# Patient Record
Sex: MALE | Race: WHITE | NOT HISPANIC OR LATINO | Employment: FULL TIME | URBAN - METROPOLITAN AREA
[De-identification: names, ages, dates, MRNs, and addresses within clinical notes are randomized per-mention and may not be internally consistent; named-entity substitution may affect disease eponyms.]

---

## 2017-05-01 RX ORDER — CARVEDILOL 12.5 MG/1
TABLET ORAL
Qty: 60 TABLET | Refills: 2 | OUTPATIENT
Start: 2017-05-01

## 2017-05-01 RX ORDER — HYDROCHLOROTHIAZIDE 25 MG/1
TABLET ORAL
Qty: 30 TABLET | Refills: 2 | OUTPATIENT
Start: 2017-05-01

## 2017-05-01 RX ORDER — AMLODIPINE BESYLATE 10 MG/1
TABLET ORAL
Qty: 30 TABLET | Refills: 2 | OUTPATIENT
Start: 2017-05-01

## 2017-05-17 RX ORDER — HYDROCHLOROTHIAZIDE 25 MG/1
TABLET ORAL
Qty: 30 TABLET | Refills: 2 | OUTPATIENT
Start: 2017-05-17

## 2017-05-17 RX ORDER — AMLODIPINE BESYLATE 10 MG/1
TABLET ORAL
Qty: 30 TABLET | Refills: 2 | OUTPATIENT
Start: 2017-05-17

## 2017-05-17 RX ORDER — CARVEDILOL 12.5 MG/1
TABLET ORAL
Qty: 60 TABLET | Refills: 2 | OUTPATIENT
Start: 2017-05-17

## 2017-06-12 ENCOUNTER — OFFICE VISIT (OUTPATIENT)
Dept: FAMILY MEDICINE CLINIC | Facility: CLINIC | Age: 47
End: 2017-06-12

## 2017-06-12 VITALS
WEIGHT: 315 LBS | RESPIRATION RATE: 16 BRPM | BODY MASS INDEX: 44.1 KG/M2 | DIASTOLIC BLOOD PRESSURE: 124 MMHG | HEIGHT: 71 IN | HEART RATE: 85 BPM | SYSTOLIC BLOOD PRESSURE: 228 MMHG | TEMPERATURE: 99.6 F

## 2017-06-12 DIAGNOSIS — Z12.5 SCREENING FOR PROSTATE CANCER: ICD-10-CM

## 2017-06-12 DIAGNOSIS — E66.01 MORBID OBESITY WITH BODY MASS INDEX OF 60.0-69.9 IN ADULT (HCC): ICD-10-CM

## 2017-06-12 DIAGNOSIS — I10 ESSENTIAL HYPERTENSION: ICD-10-CM

## 2017-06-12 DIAGNOSIS — E78.2 MIXED HYPERLIPIDEMIA: Primary | ICD-10-CM

## 2017-06-12 PROBLEM — G47.30 SLEEP APNEA: Status: ACTIVE | Noted: 2017-06-12

## 2017-06-12 PROBLEM — E78.5 HYPERLIPIDEMIA: Status: ACTIVE | Noted: 2017-06-12

## 2017-06-12 PROBLEM — K21.9 GERD (GASTROESOPHAGEAL REFLUX DISEASE): Status: ACTIVE | Noted: 2017-06-12

## 2017-06-12 PROCEDURE — 99214 OFFICE O/P EST MOD 30 MIN: CPT | Performed by: FAMILY MEDICINE

## 2017-06-12 RX ORDER — AMLODIPINE BESYLATE 5 MG/1
5 TABLET ORAL DAILY
Qty: 30 TABLET | Refills: 5 | Status: SHIPPED | OUTPATIENT
Start: 2017-06-12 | End: 2017-12-12 | Stop reason: SDUPTHER

## 2017-06-12 RX ORDER — CARVEDILOL 12.5 MG/1
12.5 TABLET ORAL 2 TIMES DAILY WITH MEALS
Qty: 30 TABLET | Refills: 5 | Status: SHIPPED | OUTPATIENT
Start: 2017-06-12 | End: 2017-06-13 | Stop reason: SDUPTHER

## 2017-06-12 RX ORDER — HYDROCHLOROTHIAZIDE 25 MG/1
25 TABLET ORAL DAILY
Qty: 30 TABLET | Refills: 5 | Status: SHIPPED | OUTPATIENT
Start: 2017-06-12 | End: 2017-12-12 | Stop reason: SDUPTHER

## 2017-06-12 NOTE — PROGRESS NOTES
"Subjective   Alex Schafer is a 46 y.o. male.     CC: Management of HTN, Lipids, Morbid Obesity    History of Present Illness     Pt returns today > 1 year out from last visit, completely off all his medications (stopped on pt's own choice) for review of his medical healthy. He reports he stopped the medications as he had been having a lot of issues with diverticulitis (better now) as taking the medications with the diverticulitis made him sick.  Using his CPAP nightly and well.  He is talking to Dr. Luis A Tubbs regarding weight reduction surgery and is very interested.    The following portions of the patient's history were reviewed and updated as appropriate: allergies, current medications, past family history, past medical history, past social history, past surgical history and problem list.    Review of Systems   Constitutional: Negative for activity change, chills, fatigue and fever.   Respiratory: Negative for cough and shortness of breath.    Cardiovascular: Negative for chest pain and palpitations.   Gastrointestinal: Negative for abdominal pain.   Endocrine: Negative for cold intolerance.   Psychiatric/Behavioral: Negative for behavioral problems, dysphoric mood and suicidal ideas. The patient is not nervous/anxious.      BP (!) 228/124  Pulse 85  Temp 99.6 °F (37.6 °C) (Oral)   Resp 16  Ht 71\" (180.3 cm)  Wt (!) 465 lb (211 kg)  BMI 64.85 kg/m2    Objective   Physical Exam   Constitutional: He appears well-developed and well-nourished.   Neck: Neck supple. No thyromegaly present.   Cardiovascular: Normal rate and regular rhythm.    No murmur heard.  Pulmonary/Chest: Effort normal and breath sounds normal.   Abdominal: Bowel sounds are normal.   Psychiatric: He has a normal mood and affect. His behavior is normal.   Nursing note and vitals reviewed.      Assessment/Plan   Alex was seen today for hypertension, obesity and hyperlipidemia.    Diagnoses and all orders for this visit:    Mixed " hyperlipidemia  -     Lipid panel    Essential hypertension  Comments:  severe, uncontrolled  Orders:  -     Comprehensive metabolic panel  -     Lipid panel  -     CBC and Differential  -     TSH  -     amLODIPine (NORVASC) 5 MG tablet; Take 1 tablet by mouth Daily.  -     carvedilol (COREG) 12.5 MG tablet; Take 1 tablet by mouth 2 (Two) Times a Day With Meals.  -     hydrochlorothiazide (HYDRODIURIL) 25 MG tablet; Take 1 tablet by mouth Daily.    Morbid obesity with body mass index of 60.0-69.9 in adult  -     Ambulatory Referral to Bariatric Surgery    Screening for prostate cancer  -     PSA

## 2017-06-13 ENCOUNTER — TELEPHONE (OUTPATIENT)
Dept: FAMILY MEDICINE CLINIC | Facility: CLINIC | Age: 47
End: 2017-06-13

## 2017-06-13 DIAGNOSIS — I10 ESSENTIAL HYPERTENSION: ICD-10-CM

## 2017-06-13 RX ORDER — CARVEDILOL 12.5 MG/1
12.5 TABLET ORAL 2 TIMES DAILY WITH MEALS
Qty: 60 TABLET | Refills: 5 | Status: SHIPPED | OUTPATIENT
Start: 2017-06-13 | End: 2017-12-12 | Stop reason: SDUPTHER

## 2017-06-21 LAB
ALBUMIN SERPL-MCNC: 4.7 G/DL (ref 3.5–5.2)
ALBUMIN/GLOB SERPL: 1.4 G/DL
ALP SERPL-CCNC: 88 U/L (ref 39–117)
ALT SERPL-CCNC: 68 U/L (ref 1–41)
AST SERPL-CCNC: 41 U/L (ref 1–40)
BASOPHILS # BLD AUTO: 0.03 10*3/MM3 (ref 0–0.2)
BASOPHILS NFR BLD AUTO: 0.4 % (ref 0–1.5)
BILIRUB SERPL-MCNC: 0.5 MG/DL (ref 0.1–1.2)
BUN SERPL-MCNC: 19 MG/DL (ref 6–20)
BUN/CREAT SERPL: 16.8 (ref 7–25)
CALCIUM SERPL-MCNC: 10 MG/DL (ref 8.6–10.5)
CHLORIDE SERPL-SCNC: 99 MMOL/L (ref 98–107)
CHOLEST SERPL-MCNC: 206 MG/DL (ref 0–200)
CO2 SERPL-SCNC: 22.2 MMOL/L (ref 22–29)
CREAT SERPL-MCNC: 1.13 MG/DL (ref 0.76–1.27)
EOSINOPHIL # BLD AUTO: 0.19 10*3/MM3 (ref 0–0.7)
EOSINOPHIL NFR BLD AUTO: 2.3 % (ref 0.3–6.2)
ERYTHROCYTE [DISTWIDTH] IN BLOOD BY AUTOMATED COUNT: 13.1 % (ref 11.5–14.5)
GLOBULIN SER CALC-MCNC: 3.3 GM/DL
GLUCOSE SERPL-MCNC: 96 MG/DL (ref 65–99)
HCT VFR BLD AUTO: 46.3 % (ref 40.4–52.2)
HDLC SERPL-MCNC: 40 MG/DL (ref 40–60)
HGB BLD-MCNC: 15.4 G/DL (ref 13.7–17.6)
IMM GRANULOCYTES # BLD: 0.04 10*3/MM3 (ref 0–0.03)
IMM GRANULOCYTES NFR BLD: 0.5 % (ref 0–0.5)
LDLC SERPL CALC-MCNC: 142 MG/DL (ref 0–100)
LYMPHOCYTES # BLD AUTO: 2.16 10*3/MM3 (ref 0.9–4.8)
LYMPHOCYTES NFR BLD AUTO: 26.6 % (ref 19.6–45.3)
MCH RBC QN AUTO: 32.1 PG (ref 27–32.7)
MCHC RBC AUTO-ENTMCNC: 33.3 G/DL (ref 32.6–36.4)
MCV RBC AUTO: 96.5 FL (ref 79.8–96.2)
MONOCYTES # BLD AUTO: 0.74 10*3/MM3 (ref 0.2–1.2)
MONOCYTES NFR BLD AUTO: 9.1 % (ref 5–12)
NEUTROPHILS # BLD AUTO: 4.96 10*3/MM3 (ref 1.9–8.1)
NEUTROPHILS NFR BLD AUTO: 61.1 % (ref 42.7–76)
PLATELET # BLD AUTO: 262 10*3/MM3 (ref 140–500)
POTASSIUM SERPL-SCNC: 4.2 MMOL/L (ref 3.5–5.2)
PROT SERPL-MCNC: 8 G/DL (ref 6–8.5)
PSA SERPL-MCNC: 0.53 NG/ML (ref 0–4)
RBC # BLD AUTO: 4.8 10*6/MM3 (ref 4.6–6)
SODIUM SERPL-SCNC: 142 MMOL/L (ref 136–145)
TRIGL SERPL-MCNC: 121 MG/DL (ref 0–150)
TSH SERPL DL<=0.005 MIU/L-ACNC: 3.91 MIU/ML (ref 0.27–4.2)
VLDLC SERPL CALC-MCNC: 24.2 MG/DL (ref 5–40)
WBC # BLD AUTO: 8.12 10*3/MM3 (ref 4.5–10.7)

## 2017-12-12 ENCOUNTER — OFFICE VISIT (OUTPATIENT)
Dept: FAMILY MEDICINE CLINIC | Facility: CLINIC | Age: 47
End: 2017-12-12

## 2017-12-12 VITALS
BODY MASS INDEX: 44.1 KG/M2 | WEIGHT: 315 LBS | TEMPERATURE: 99.8 F | DIASTOLIC BLOOD PRESSURE: 96 MMHG | SYSTOLIC BLOOD PRESSURE: 210 MMHG | HEART RATE: 89 BPM | RESPIRATION RATE: 18 BRPM | HEIGHT: 71 IN

## 2017-12-12 DIAGNOSIS — I10 ESSENTIAL HYPERTENSION: ICD-10-CM

## 2017-12-12 DIAGNOSIS — G47.33 OBSTRUCTIVE SLEEP APNEA SYNDROME: Primary | ICD-10-CM

## 2017-12-12 PROCEDURE — 99213 OFFICE O/P EST LOW 20 MIN: CPT | Performed by: FAMILY MEDICINE

## 2017-12-12 RX ORDER — HYDRALAZINE HYDROCHLORIDE 10 MG/1
10 TABLET, FILM COATED ORAL 2 TIMES DAILY
Qty: 60 TABLET | Refills: 5 | Status: SHIPPED | OUTPATIENT
Start: 2017-12-12 | End: 2018-07-31 | Stop reason: ALTCHOICE

## 2017-12-12 RX ORDER — CARVEDILOL 25 MG/1
25 TABLET ORAL 2 TIMES DAILY WITH MEALS
Qty: 60 TABLET | Refills: 5 | Status: SHIPPED | OUTPATIENT
Start: 2017-12-12 | End: 2018-06-12 | Stop reason: SDUPTHER

## 2017-12-12 RX ORDER — CARVEDILOL 12.5 MG/1
12.5 TABLET ORAL 2 TIMES DAILY WITH MEALS
Qty: 60 TABLET | Refills: 5 | Status: CANCELLED | OUTPATIENT
Start: 2017-12-12

## 2017-12-12 RX ORDER — HYDROCHLOROTHIAZIDE 25 MG/1
25 TABLET ORAL DAILY
Qty: 30 TABLET | Refills: 5 | Status: SHIPPED | OUTPATIENT
Start: 2017-12-12 | End: 2018-06-11 | Stop reason: SDUPTHER

## 2017-12-12 RX ORDER — AMLODIPINE BESYLATE 5 MG/1
5 TABLET ORAL DAILY
Qty: 30 TABLET | Refills: 5 | Status: SHIPPED | OUTPATIENT
Start: 2017-12-12 | End: 2018-07-31 | Stop reason: ALTCHOICE

## 2017-12-12 NOTE — PROGRESS NOTES
"Subjective   Alex Schafer is a 47 y.o. male.     History of Present Illness     Chief Complaint:   Chief Complaint   Patient presents with   • Hypertension     MED REFILL - COLONSCOPY DUE EVERY YEAR PER PT  = DR BALJINDER GUZMAN BP TODAY    • Hyperlipidemia     EYE EXAM DUE        Alex Schafer 47 y.o. male who presents today for Medical Management of the below listed issues and medication refills.  he has a problem list of   Patient Active Problem List   Diagnosis   • Hyperlipidemia   • Hypertension   • GERD (gastroesophageal reflux disease)   • Morbid obesity with body mass index of 60.0-69.9 in adult   • Sleep apnea   .  Since the last visit, he has overall felt well.  he has been compliant with   Current Outpatient Prescriptions:   •  carvedilol (COREG) 25 MG tablet, Take 1 tablet by mouth 2 (Two) Times a Day With Meals., Disp: 60 tablet, Rfl: 5  •  amLODIPine (NORVASC) 5 MG tablet, Take 1 tablet by mouth Daily., Disp: 30 tablet, Rfl: 5  •  hydrALAZINE (APRESOLINE) 10 MG tablet, Take 1 tablet by mouth 2 (Two) Times a Day., Disp: 60 tablet, Rfl: 5  •  hydrochlorothiazide (HYDRODIURIL) 25 MG tablet, Take 1 tablet by mouth Daily., Disp: 30 tablet, Rfl: 5.  he denies medication side effects.    Pt using his CPAP well every day but hasn't had a titration in approximately 10 years!    All of the chronic condition(s) listed above are stable w/o issues.    BP (!) 210/96  Pulse 89  Temp 99.8 °F (37.7 °C) (Oral)   Resp 18  Ht 180.3 cm (71\")  Wt (!) 204 kg (450 lb)  BMI 62.76 kg/m2    Results for orders placed or performed in visit on 06/12/17   Comprehensive metabolic panel   Result Value Ref Range    Glucose 96 65 - 99 mg/dL    BUN 19 6 - 20 mg/dL    Creatinine 1.13 0.76 - 1.27 mg/dL    eGFR Non African Am 70 >60 mL/min/1.73    eGFR African Am 84 >60 mL/min/1.73    BUN/Creatinine Ratio 16.8 7.0 - 25.0    Sodium 142 136 - 145 mmol/L    Potassium 4.2 3.5 - 5.2 mmol/L    Chloride 99 98 - 107 mmol/L    Total CO2 22.2 22.0 " - 29.0 mmol/L    Calcium 10.0 8.6 - 10.5 mg/dL    Total Protein 8.0 6.0 - 8.5 g/dL    Albumin 4.70 3.50 - 5.20 g/dL    Globulin 3.3 gm/dL    A/G Ratio 1.4 g/dL    Total Bilirubin 0.5 0.1 - 1.2 mg/dL    Alkaline Phosphatase 88 39 - 117 U/L    AST (SGOT) 41 (H) 1 - 40 U/L    ALT (SGPT) 68 (H) 1 - 41 U/L   Lipid panel   Result Value Ref Range    Total Cholesterol 206 (H) 0 - 200 mg/dL    Triglycerides 121 0 - 150 mg/dL    HDL Cholesterol 40 40 - 60 mg/dL    VLDL Cholesterol 24.2 5 - 40 mg/dL    LDL Cholesterol  142 (H) 0 - 100 mg/dL   TSH   Result Value Ref Range    TSH 3.910 0.270 - 4.200 mIU/mL   PSA   Result Value Ref Range    PSA 0.525 0.000 - 4.000 ng/mL   CBC and Differential   Result Value Ref Range    WBC 8.12 4.50 - 10.70 10*3/mm3    RBC 4.80 4.60 - 6.00 10*6/mm3    Hemoglobin 15.4 13.7 - 17.6 g/dL    Hematocrit 46.3 40.4 - 52.2 %    MCV 96.5 (H) 79.8 - 96.2 fL    MCH 32.1 27.0 - 32.7 pg    MCHC 33.3 32.6 - 36.4 g/dL    RDW 13.1 11.5 - 14.5 %    Platelets 262 140 - 500 10*3/mm3    Neutrophil Rel % 61.1 42.7 - 76.0 %    Lymphocyte Rel % 26.6 19.6 - 45.3 %    Monocyte Rel % 9.1 5.0 - 12.0 %    Eosinophil Rel % 2.3 0.3 - 6.2 %    Basophil Rel % 0.4 0.0 - 1.5 %    Neutrophils Absolute 4.96 1.90 - 8.10 10*3/mm3    Lymphocytes Absolute 2.16 0.90 - 4.80 10*3/mm3    Monocytes Absolute 0.74 0.20 - 1.20 10*3/mm3    Eosinophils Absolute 0.19 0.00 - 0.70 10*3/mm3    Basophils Absolute 0.03 0.00 - 0.20 10*3/mm3    Immature Granulocyte Rel % 0.5 0.0 - 0.5 %    Immature Grans Absolute 0.04 (H) 0.00 - 0.03 10*3/mm3           The following portions of the patient's history were reviewed and updated as appropriate: allergies, current medications, past family history, past medical history, past social history, past surgical history and problem list.    Review of Systems   Constitutional: Negative for activity change, chills, fatigue and fever.   Respiratory: Negative for cough and shortness of breath.    Cardiovascular: Negative  for chest pain and palpitations.   Gastrointestinal: Negative for abdominal pain.   Endocrine: Negative for cold intolerance.   Psychiatric/Behavioral: Negative for behavioral problems and dysphoric mood. The patient is not nervous/anxious.        Objective   Physical Exam   Constitutional: He appears well-developed and well-nourished.   Neck: Neck supple. No thyromegaly present.   Cardiovascular: Normal rate and regular rhythm.    No murmur heard.  Pulmonary/Chest: Effort normal and breath sounds normal.   Abdominal: Bowel sounds are normal.   Psychiatric: He has a normal mood and affect. His behavior is normal.   Nursing note and vitals reviewed.      Assessment/Plan   Alex was seen today for hypertension and hyperlipidemia.    Diagnoses and all orders for this visit:    Obstructive sleep apnea syndrome  -     Ambulatory Referral to Sleep Medicine    Essential hypertension  Comments:  severe, uncontrolled  Orders:  -     amLODIPine (NORVASC) 5 MG tablet; Take 1 tablet by mouth Daily.  -     hydrochlorothiazide (HYDRODIURIL) 25 MG tablet; Take 1 tablet by mouth Daily.  -     carvedilol (COREG) 25 MG tablet; Take 1 tablet by mouth 2 (Two) Times a Day With Meals.  -     hydrALAZINE (APRESOLINE) 10 MG tablet; Take 1 tablet by mouth 2 (Two) Times a Day.    Other orders  -     Cancel: carvedilol (COREG) 12.5 MG tablet; Take 1 tablet by mouth 2 (Two) Times a Day With Meals.

## 2017-12-29 ENCOUNTER — TELEPHONE (OUTPATIENT)
Dept: FAMILY MEDICINE CLINIC | Facility: CLINIC | Age: 47
End: 2017-12-29

## 2017-12-29 NOTE — TELEPHONE ENCOUNTER
Patient called with concerns about BP being to low since change of medication and has had a few 'collapsing' spells at work with BP running 112/70 at the time. Spoke with Antonina who suggests it could be another issue rather than the BP medication and to D/C amlodipine and follow up next week with a physician. Informed patient to go to ER with any further collapsing episodes between now and when he makes his appt

## 2018-01-23 ENCOUNTER — APPOINTMENT (OUTPATIENT)
Dept: SLEEP MEDICINE | Facility: HOSPITAL | Age: 48
End: 2018-01-23
Attending: INTERNAL MEDICINE

## 2018-06-11 RX ORDER — HYDRALAZINE HYDROCHLORIDE 10 MG/1
10 TABLET, FILM COATED ORAL 2 TIMES DAILY
Qty: 60 TABLET | Refills: 5 | Status: CANCELLED | OUTPATIENT
Start: 2018-06-11

## 2018-06-11 RX ORDER — AMLODIPINE BESYLATE 5 MG/1
5 TABLET ORAL DAILY
Qty: 30 TABLET | Refills: 5 | Status: CANCELLED | OUTPATIENT
Start: 2018-06-11

## 2018-06-11 RX ORDER — CARVEDILOL 25 MG/1
25 TABLET ORAL 2 TIMES DAILY WITH MEALS
Qty: 60 TABLET | Refills: 5 | Status: CANCELLED | OUTPATIENT
Start: 2018-06-11

## 2018-06-11 NOTE — PROGRESS NOTES
"Subjective   Alex Schafer is a 47 y.o. male.     History of Present Illness     Chief Complaint:   Chief Complaint   Patient presents with   • Hypertension     med refill  - no labs   • Hyperlipidemia     pt not taiking bp meds as prescribed only hctz per pt        Alex Schafer 47 y.o. male who presents today for Medical Management of the below listed issues and medication refills.  he has a problem list of   Patient Active Problem List   Diagnosis   • Hyperlipidemia   • Hypertension   • GERD (gastroesophageal reflux disease)   • Morbid obesity with body mass index of 60.0-69.9 in adult   • Sleep apnea   .  Since the last visit, he has overall felt well.  he has NOT been compliant with   Current Outpatient Prescriptions:   •  amLODIPine (NORVASC) 5 MG tablet, Take 1 tablet by mouth Daily., Disp: 30 tablet, Rfl: 5  •  carvedilol (COREG) 25 MG tablet, Take 1 tablet by mouth 2 (Two) Times a Day With Meals., Disp: 60 tablet, Rfl: 5  •  hydrALAZINE (APRESOLINE) 10 MG tablet, Take 1 tablet by mouth 2 (Two) Times a Day., Disp: 60 tablet, Rfl: 5  •  hydrochlorothiazide (HYDRODIURIL) 25 MG tablet, Take 1 tablet by mouth Daily., Disp: 30 tablet, Rfl: 5.  he denies medication side effects.    He has NOT been taking anything but his HCTZ as he \"bottoms out\" when he takes anything else. BPs run as high at home as they do here. BPs taken by a nurse at work shows some very low BPs at times, even leading to some fall, although doesn't happen all the time. BPs are extremely variable. Uses his CPAP well and has had his pressures checked recently.    All of the chronic condition(s) listed above are stable w/o issues.    BP (!) 213/104   Pulse 96   Temp 99.7 °F (37.6 °C) (Oral)   Resp 18   Ht 180.3 cm (71\")   Wt (!) 204 kg (450 lb)   BMI 62.76 kg/m²     Results for orders placed or performed in visit on 06/12/17   Comprehensive metabolic panel   Result Value Ref Range    Glucose 96 65 - 99 mg/dL    BUN 19 6 - 20 mg/dL    Creatinine " 1.13 0.76 - 1.27 mg/dL    eGFR Non African Am 70 >60 mL/min/1.73    eGFR African Am 84 >60 mL/min/1.73    BUN/Creatinine Ratio 16.8 7.0 - 25.0    Sodium 142 136 - 145 mmol/L    Potassium 4.2 3.5 - 5.2 mmol/L    Chloride 99 98 - 107 mmol/L    Total CO2 22.2 22.0 - 29.0 mmol/L    Calcium 10.0 8.6 - 10.5 mg/dL    Total Protein 8.0 6.0 - 8.5 g/dL    Albumin 4.70 3.50 - 5.20 g/dL    Globulin 3.3 gm/dL    A/G Ratio 1.4 g/dL    Total Bilirubin 0.5 0.1 - 1.2 mg/dL    Alkaline Phosphatase 88 39 - 117 U/L    AST (SGOT) 41 (H) 1 - 40 U/L    ALT (SGPT) 68 (H) 1 - 41 U/L   Lipid panel   Result Value Ref Range    Total Cholesterol 206 (H) 0 - 200 mg/dL    Triglycerides 121 0 - 150 mg/dL    HDL Cholesterol 40 40 - 60 mg/dL    VLDL Cholesterol 24.2 5 - 40 mg/dL    LDL Cholesterol  142 (H) 0 - 100 mg/dL   TSH   Result Value Ref Range    TSH 3.910 0.270 - 4.200 mIU/mL   PSA   Result Value Ref Range    PSA 0.525 0.000 - 4.000 ng/mL   CBC and Differential   Result Value Ref Range    WBC 8.12 4.50 - 10.70 10*3/mm3    RBC 4.80 4.60 - 6.00 10*6/mm3    Hemoglobin 15.4 13.7 - 17.6 g/dL    Hematocrit 46.3 40.4 - 52.2 %    MCV 96.5 (H) 79.8 - 96.2 fL    MCH 32.1 27.0 - 32.7 pg    MCHC 33.3 32.6 - 36.4 g/dL    RDW 13.1 11.5 - 14.5 %    Platelets 262 140 - 500 10*3/mm3    Neutrophil Rel % 61.1 42.7 - 76.0 %    Lymphocyte Rel % 26.6 19.6 - 45.3 %    Monocyte Rel % 9.1 5.0 - 12.0 %    Eosinophil Rel % 2.3 0.3 - 6.2 %    Basophil Rel % 0.4 0.0 - 1.5 %    Neutrophils Absolute 4.96 1.90 - 8.10 10*3/mm3    Lymphocytes Absolute 2.16 0.90 - 4.80 10*3/mm3    Monocytes Absolute 0.74 0.20 - 1.20 10*3/mm3    Eosinophils Absolute 0.19 0.00 - 0.70 10*3/mm3    Basophils Absolute 0.03 0.00 - 0.20 10*3/mm3    Immature Granulocyte Rel % 0.5 0.0 - 0.5 %    Immature Grans Absolute 0.04 (H) 0.00 - 0.03 10*3/mm3           The following portions of the patient's history were reviewed and updated as appropriate: allergies, current medications, past family history,  past medical history, past social history, past surgical history and problem list.    Review of Systems   Constitutional: Negative for activity change, chills, fatigue and fever.   Respiratory: Negative for cough and shortness of breath.    Cardiovascular: Negative for chest pain and palpitations.   Gastrointestinal: Negative for abdominal pain.   Endocrine: Negative for cold intolerance.   Psychiatric/Behavioral: Negative for behavioral problems and dysphoric mood. The patient is not nervous/anxious.        Objective   Physical Exam   Constitutional: He appears well-developed and well-nourished.   Neck: Neck supple. No thyromegaly present.   Cardiovascular: Normal rate and regular rhythm.    No murmur heard.  Pulmonary/Chest: Effort normal and breath sounds normal.   Abdominal: Bowel sounds are normal. There is no tenderness.   Psychiatric: He has a normal mood and affect. His behavior is normal.   Nursing note and vitals reviewed.      Assessment/Plan   Alex was seen today for hypertension and hyperlipidemia.    Diagnoses and all orders for this visit:    Essential hypertension  Comments:  severe, uncontrolled  Orders:  -     Comprehensive metabolic panel  -     Lipid panel  -     CBC and Differential  -     TSH  -     hydrochlorothiazide (HYDRODIURIL) 25 MG tablet; Take 1 tablet by mouth Daily.  -     Ambulatory Referral to Cardiology  -     carvedilol (COREG) 25 MG tablet; Take 1 tablet by mouth 2 (Two) Times a Day With Meals.    Screening for prostate cancer  -     PSA    Other orders  -     Cancel: carvedilol (COREG) 25 MG tablet; Take 1 tablet by mouth 2 (Two) Times a Day With Meals.  -     Cancel: amLODIPine (NORVASC) 5 MG tablet; Take 1 tablet by mouth Daily.  -     Cancel: hydrALAZINE (APRESOLINE) 10 MG tablet; Take 1 tablet by mouth 2 (Two) Times a Day.    Long discussion with pt regarding his severe risk to life if not following medical advice, taking medications and f/u in a timly manner. Pt voices  understanding.

## 2018-06-12 ENCOUNTER — OFFICE VISIT (OUTPATIENT)
Dept: FAMILY MEDICINE CLINIC | Facility: CLINIC | Age: 48
End: 2018-06-12

## 2018-06-12 VITALS
HEART RATE: 96 BPM | HEIGHT: 71 IN | RESPIRATION RATE: 18 BRPM | SYSTOLIC BLOOD PRESSURE: 213 MMHG | WEIGHT: 315 LBS | TEMPERATURE: 99.7 F | DIASTOLIC BLOOD PRESSURE: 104 MMHG | BODY MASS INDEX: 44.1 KG/M2

## 2018-06-12 DIAGNOSIS — I10 ESSENTIAL HYPERTENSION: Primary | ICD-10-CM

## 2018-06-12 DIAGNOSIS — Z12.5 SCREENING FOR PROSTATE CANCER: ICD-10-CM

## 2018-06-12 LAB
ALBUMIN SERPL-MCNC: 4.7 G/DL (ref 3.5–5.2)
ALBUMIN/GLOB SERPL: 1.5 G/DL
ALP SERPL-CCNC: 89 U/L (ref 39–117)
ALT SERPL-CCNC: 72 U/L (ref 1–41)
AST SERPL-CCNC: 39 U/L (ref 1–40)
BASOPHILS # BLD AUTO: 0.02 10*3/MM3 (ref 0–0.2)
BASOPHILS NFR BLD AUTO: 0.3 % (ref 0–1.5)
BILIRUB SERPL-MCNC: 0.5 MG/DL (ref 0.1–1.2)
BUN SERPL-MCNC: 17 MG/DL (ref 6–20)
BUN/CREAT SERPL: 14.7 (ref 7–25)
CALCIUM SERPL-MCNC: 9.8 MG/DL (ref 8.6–10.5)
CHLORIDE SERPL-SCNC: 103 MMOL/L (ref 98–107)
CHOLEST SERPL-MCNC: 212 MG/DL (ref 0–200)
CO2 SERPL-SCNC: 27.3 MMOL/L (ref 22–29)
CREAT SERPL-MCNC: 1.16 MG/DL (ref 0.76–1.27)
EOSINOPHIL # BLD AUTO: 0.13 10*3/MM3 (ref 0–0.7)
EOSINOPHIL NFR BLD AUTO: 1.7 % (ref 0.3–6.2)
ERYTHROCYTE [DISTWIDTH] IN BLOOD BY AUTOMATED COUNT: 13 % (ref 11.5–14.5)
GFR SERPLBLD CREATININE-BSD FMLA CKD-EPI: 67 ML/MIN/1.73
GFR SERPLBLD CREATININE-BSD FMLA CKD-EPI: 81 ML/MIN/1.73
GLOBULIN SER CALC-MCNC: 3.2 GM/DL
GLUCOSE SERPL-MCNC: 124 MG/DL (ref 65–99)
HCT VFR BLD AUTO: 45.2 % (ref 40.4–52.2)
HDLC SERPL-MCNC: 33 MG/DL (ref 40–60)
HGB BLD-MCNC: 14.8 G/DL (ref 13.7–17.6)
IMM GRANULOCYTES # BLD: 0.05 10*3/MM3 (ref 0–0.03)
IMM GRANULOCYTES NFR BLD: 0.6 % (ref 0–0.5)
LDLC SERPL CALC-MCNC: 154 MG/DL (ref 0–100)
LYMPHOCYTES # BLD AUTO: 2.13 10*3/MM3 (ref 0.9–4.8)
LYMPHOCYTES NFR BLD AUTO: 27.3 % (ref 19.6–45.3)
MCH RBC QN AUTO: 32.4 PG (ref 27–32.7)
MCHC RBC AUTO-ENTMCNC: 32.7 G/DL (ref 32.6–36.4)
MCV RBC AUTO: 98.9 FL (ref 79.8–96.2)
MONOCYTES # BLD AUTO: 0.71 10*3/MM3 (ref 0.2–1.2)
MONOCYTES NFR BLD AUTO: 9.1 % (ref 5–12)
NEUTROPHILS # BLD AUTO: 4.82 10*3/MM3 (ref 1.9–8.1)
NEUTROPHILS NFR BLD AUTO: 61.6 % (ref 42.7–76)
PLATELET # BLD AUTO: 236 10*3/MM3 (ref 140–500)
POTASSIUM SERPL-SCNC: 4 MMOL/L (ref 3.5–5.2)
PROT SERPL-MCNC: 7.9 G/DL (ref 6–8.5)
PSA SERPL-MCNC: 0.56 NG/ML (ref 0–4)
RBC # BLD AUTO: 4.57 10*6/MM3 (ref 4.6–6)
SODIUM SERPL-SCNC: 143 MMOL/L (ref 136–145)
TRIGL SERPL-MCNC: 124 MG/DL (ref 0–150)
TSH SERPL DL<=0.005 MIU/L-ACNC: 1.98 MIU/ML (ref 0.27–4.2)
VLDLC SERPL CALC-MCNC: 24.8 MG/DL (ref 5–40)
WBC # BLD AUTO: 7.81 10*3/MM3 (ref 4.5–10.7)

## 2018-06-12 PROCEDURE — 99213 OFFICE O/P EST LOW 20 MIN: CPT | Performed by: FAMILY MEDICINE

## 2018-06-12 RX ORDER — HYDROCHLOROTHIAZIDE 25 MG/1
25 TABLET ORAL DAILY
Qty: 30 TABLET | Refills: 5 | Status: SHIPPED | OUTPATIENT
Start: 2018-06-12 | End: 2018-12-11 | Stop reason: SDUPTHER

## 2018-06-12 RX ORDER — CARVEDILOL 25 MG/1
25 TABLET ORAL 2 TIMES DAILY WITH MEALS
Qty: 60 TABLET | Refills: 5 | Status: SHIPPED | OUTPATIENT
Start: 2018-06-12 | End: 2018-12-11 | Stop reason: SDUPTHER

## 2018-07-30 ENCOUNTER — DOCUMENTATION (OUTPATIENT)
Dept: CARDIOLOGY | Facility: CLINIC | Age: 48
End: 2018-07-30

## 2018-07-31 ENCOUNTER — OFFICE VISIT (OUTPATIENT)
Dept: CARDIOLOGY | Facility: CLINIC | Age: 48
End: 2018-07-31

## 2018-07-31 VITALS
HEIGHT: 71 IN | SYSTOLIC BLOOD PRESSURE: 154 MMHG | BODY MASS INDEX: 44.1 KG/M2 | HEART RATE: 82 BPM | WEIGHT: 315 LBS | DIASTOLIC BLOOD PRESSURE: 100 MMHG

## 2018-07-31 DIAGNOSIS — I10 ESSENTIAL HYPERTENSION: Primary | ICD-10-CM

## 2018-07-31 PROCEDURE — 99204 OFFICE O/P NEW MOD 45 MIN: CPT | Performed by: INTERNAL MEDICINE

## 2018-07-31 PROCEDURE — 93000 ELECTROCARDIOGRAM COMPLETE: CPT | Performed by: INTERNAL MEDICINE

## 2018-08-02 NOTE — PROGRESS NOTES
Subjective:     Encounter Date:07/31/2018      Patient ID: Alex Schafer is a 47 y.o. male.    Chief Complaint:  Hypertension   This is a chronic problem. The problem is resistant.       47-year-old gentleman who presents today for evaluation.Patient has had blood pressure issues.  It has been fluctuating quite a bit.  Is running as high as 213/104 and other times dropped down on her medicines down to 68/46.  His weight has not recently changed in the past 4-5 years.  He denies chest pain shortness of breath and dizziness.  He does have increasing fatigue and with his volatile blood pressure he was seen today for further evaluation    Review of Systems   All other systems reviewed and are negative.        ECG 12 Lead  Date/Time: 7/31/2018 8:55 AM  Performed by: GIOVANI DURHAM  Authorized by: GIOVANI DURHAM   Comparison: compared with previous ECG from 4/24/2015  Similar to previous ECG  Rhythm: sinus rhythm  Clinical impression: normal ECG               Objective:     Physical Exam   Constitutional: He is oriented to person, place, and time. He appears well-developed.   HENT:   Head: Normocephalic.   Eyes: Conjunctivae are normal.   Neck: Normal range of motion.   Cardiovascular: Normal rate, regular rhythm and normal heart sounds.    Pulmonary/Chest: Breath sounds normal.   Abdominal: Soft. Bowel sounds are normal.   Musculoskeletal: Normal range of motion. He exhibits no edema.   Neurological: He is alert and oriented to person, place, and time.   Skin: Skin is warm and dry.   Psychiatric: He has a normal mood and affect. His behavior is normal.   Vitals reviewed.      Lab Review:       Assessment:          Diagnosis Plan   1. Essential hypertension            Plan:         1.  Very volatile blood pressure.  On multiple medications been fluctuating quite a bit.  I very extensive discussion with the patient including a lot of time explaining the fact that salt is more than likely what is causing his  extreme swings in blood pressure.  He's got to start watching his salt intake weren't done a diary and educating himself.  Obviously if he can lose some weight that would help some I also told some basic exercise would also be very beneficial.  He is going try to clean things up before we start very his medications see how he does and follow back up with him in 4-6 weeks.

## 2018-08-28 ENCOUNTER — OFFICE VISIT (OUTPATIENT)
Dept: CARDIOLOGY | Facility: CLINIC | Age: 48
End: 2018-08-28

## 2018-08-28 VITALS
DIASTOLIC BLOOD PRESSURE: 100 MMHG | WEIGHT: 315 LBS | BODY MASS INDEX: 44.1 KG/M2 | HEIGHT: 71 IN | HEART RATE: 76 BPM | SYSTOLIC BLOOD PRESSURE: 160 MMHG

## 2018-08-28 DIAGNOSIS — E78.2 MIXED HYPERLIPIDEMIA: ICD-10-CM

## 2018-08-28 DIAGNOSIS — I10 ESSENTIAL HYPERTENSION: Primary | ICD-10-CM

## 2018-08-28 DIAGNOSIS — G47.33 OBSTRUCTIVE SLEEP APNEA SYNDROME: ICD-10-CM

## 2018-08-28 DIAGNOSIS — E66.01 MORBID OBESITY WITH BODY MASS INDEX OF 60.0-69.9 IN ADULT (HCC): ICD-10-CM

## 2018-08-28 PROCEDURE — 93000 ELECTROCARDIOGRAM COMPLETE: CPT | Performed by: NURSE PRACTITIONER

## 2018-08-28 PROCEDURE — 99214 OFFICE O/P EST MOD 30 MIN: CPT | Performed by: NURSE PRACTITIONER

## 2018-08-28 NOTE — PROGRESS NOTES
"Date of Office Visit: 2018  Encounter Provider: THAI Galindo  Place of Service: [facility] CARDIOLOGY  Patient Name: Alex Schafer  :1970    Chief Complaint   Patient presents with   • Hypertension   • Chest Pain   • Palpitations   • Shortness of Breath   • Edema   • Fatigue   :     HPI: Alex Schafer is a 47 y.o. male is a patient of Dr. Ritter. I am seeing him today for the first time and have reviewed his record.     His past medical history is significant of hypertension, hyperlipidemia, morbid obesity,GERBER, GERD, asthma, palpitations, and peptic ulcer.    Patient was evaluated on for elevated blood pressure 213/104.  His blood pressure was even as low as 68/46.  His weight has not changed in over 45 years.  He had increasing fatigue.  It was discussed salt likely causing his extreme blood pressure was encouraged to start monitoring that and keep follow-up diarrhea.  He was encouraged to lose weight.  He was to follow-up in 4-6 weeks.    Patient presents today for follow up.  His blood pressure is 160/100.  He has a blood pressure log for which he has completed when he is at work.  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  He is values of 110 systolic also 1:30 systolic as well as 160-200 systolic.  In the past he was on hydralazine 10 mg twice a day and amlodipine 5 mg daily in addition to his carvedilol 25 mg twice daily and hydrochlorothiazide 25 mg daily.  He has been maintained on carvedilol and hydrochlorothiazide with hopes that monitoring sodium intake and increased physical activity would help to improve his blood pressure and decrease fluctuation.  He is walking a couple days a week and is \"doing better with his sodium\".  He has now started to walk a couple days out of the week for 10-15 minutes.  Prior to his last visit he had never paid attention to his sodium intake.  He works third  shift and occasionally goes " "36-38 hours without sleep.  [place] the [place] and have one episode of chest heaviness which lasted for an hour on and off while he was sitting.  He does report eating funnel cake which might have caused that.  He has a history of GERD in his previously taking medication for that.  He regularly wears his CPAP.    Allergies   Allergen Reactions   • Shellfish Allergy Other (See Comments)     Throat closes   • Lisinopril Other (See Comments)     Pr dose not recall what type of reaction he had       Past Medical History:   Diagnosis Date   • Abdominal pain    • Asthma, exercise induced    • Back pain    • Bipolar disorder (CMS/HCC)    • Chest pain    • Chronic peptic ulcer    • Depression    • Diarrhea    • Difficulty sleeping    • Diverticulitis    • Dizziness    • Erectile dysfunction    • Fatigue    • Fever    • Gas    • GERD (gastroesophageal reflux disease)    • Headache    • Hepatitis A virus infection    • Hyperlipidemia    • Hypertension    • Irregular heart beat    • Itching    • Leg swelling    • Nausea    • Nervousness    • Palpitations    • Pneumonia    • Rectal bleeding    • Renal failure     \"small\"   • Seasonal allergies    • Sleep apnea    • Stomach cramps    • Vomiting        Past Surgical History:   Procedure Laterality Date   • COLONOSCOPY     • TONSILLECTOMY     • TONSILLECTOMY           Family and social history reviewed.     Review of Systems   Constitution: Positive for malaise/fatigue.   Cardiovascular: Positive for dyspnea on exertion, leg swelling and palpitations.   Musculoskeletal: Positive for joint pain.     All other systems were reviewed and are negative          Objective:     Vitals:    08/28/18 0919   BP: 160/100   BP Location: Left arm   Patient Position: Sitting   Pulse: 76   Weight: (!) 204 kg (450 lb)   Height: 180.3 cm (71\")     Body mass index is 62.76 kg/m².    PHYSICAL EXAM:  Physical Exam   Constitutional: He is oriented to person, place, and time. He appears " well-developed and well-nourished. No distress.   Morbid obese   HENT:   Head: Normocephalic.   Eyes: Conjunctivae are normal.   Glasses on   Neck: Normal range of motion. No JVD present.   Cardiovascular: Normal rate, regular rhythm, normal heart sounds and intact distal pulses.    No murmur heard.  Pulses:       Carotid pulses are 2+ on the right side, and 2+ on the left side.       Radial pulses are 2+ on the right side, and 2+ on the left side.        Posterior tibial pulses are 2+ on the right side, and 2+ on the left side.   Pulmonary/Chest: Effort normal and breath sounds normal. No respiratory distress. He has no wheezes. He has no rhonchi. He has no rales. He exhibits no tenderness.   Abdominal: Soft. Bowel sounds are normal. He exhibits no distension.   Musculoskeletal: Normal range of motion. He exhibits edema (trace bilateral ankle).   Neurological: He is alert and oriented to person, place, and time.   Skin: Skin is warm, dry and intact. No rash noted. He is not diaphoretic. No cyanosis.   Psychiatric: He has a normal mood and affect. His behavior is normal. Judgment and thought content normal.         ECG 12 Lead  Date/Time: 8/28/2018 9:32 AM  Performed by: GINO SUMNER  Authorized by: GINO SUMNER   Comparison: compared with previous ECG from 7/31/2018  Similar to previous ECG  Rhythm: sinus rhythm  Rate: normal  ST Segments: ST segments normal  T Waves: T waves normal  QRS axis: normal  Clinical impression: normal ECG            Current Outpatient Prescriptions   Medication Sig Dispense Refill   • carvedilol (COREG) 25 MG tablet Take 1 tablet by mouth 2 (Two) Times a Day With Meals. 60 tablet 5   • hydrochlorothiazide (HYDRODIURIL) 25 MG tablet Take 1 tablet by mouth Daily. 30 tablet 5     No current facility-administered medications for this visit.      Assessment:       Diagnosis Plan   1. Essential hypertension  ECG 12 Lead   2. Morbid obesity with body mass index of 60.0-69.9 in adult (CMS/HCC)      3. Obstructive sleep apnea syndrome     4. Mixed hyperlipidemia          Orders Placed This Encounter   Procedures   • ECG 12 Lead     This order was created via procedure documentation         Plan:         1. Hypertension- continues to be labile patient has now started to monitor his sodium intake.  He is starting to walk a couple days a week but plans to increase that activity to 30 minutes at least 5 days out of the week.  I encouraged that he take walking breaks while at work to help increase his mobility.    2. Hyperlipidemia  total cholesterol 212 on lipid panel 06/12/2018.  He would benefit from low-dose statin such as atorvastatin 20 mg.    3. GERBER compliant with CPAP     4. Morbid obesity- we discussed diet modification, increased physical activity and structured exercise as well was weight loss.    5.  10 year ASCVD risk of 8.4% compared with those of same age without normal risk factors at 1.7%.  He would benefit from taking aspirin 81 mg daily and I recommended that he started doing for this reason.    Follow up in 2 months with me. We will re-assess blood pressure trends, weight, and determine if his medications should be adjusted at that time.     Patient was instructed to call the office if new symptoms develop or report to nearest ER if heart attack or stroke is suspected.        It has been a pleasure to participate in this patient's care.      Thank you,  THAI Galindo      **Raji Disclaimer:**  Much of this encounter note is an electronic transcription/translation of spoken language to printed text. The electronic translation of spoken language may permit erroneous, or at times, nonsensical words or phrases to be inadvertently transcribed. Although I have reviewed the note for such errors, some may still exist.

## 2018-09-04 ENCOUNTER — OFFICE VISIT (OUTPATIENT)
Dept: RETAIL CLINIC | Facility: CLINIC | Age: 48
End: 2018-09-04

## 2018-09-04 VITALS
TEMPERATURE: 99.1 F | SYSTOLIC BLOOD PRESSURE: 156 MMHG | HEART RATE: 76 BPM | DIASTOLIC BLOOD PRESSURE: 98 MMHG | RESPIRATION RATE: 18 BRPM | OXYGEN SATURATION: 98 %

## 2018-09-04 DIAGNOSIS — J40 BRONCHITIS: Primary | ICD-10-CM

## 2018-09-04 PROCEDURE — 99213 OFFICE O/P EST LOW 20 MIN: CPT | Performed by: NURSE PRACTITIONER

## 2018-09-04 RX ORDER — BENZONATATE 100 MG/1
CAPSULE ORAL
Qty: 30 CAPSULE | Refills: 0 | Status: SHIPPED | OUTPATIENT
Start: 2018-09-04 | End: 2018-12-11

## 2018-09-04 RX ORDER — PREDNISONE 20 MG/1
TABLET ORAL
Qty: 20 TABLET | Refills: 0 | Status: SHIPPED | OUTPATIENT
Start: 2018-09-04 | End: 2018-12-11

## 2018-09-04 RX ORDER — DOXYCYCLINE 100 MG/1
100 CAPSULE ORAL 2 TIMES DAILY
Qty: 14 CAPSULE | Refills: 0 | Status: SHIPPED | OUTPATIENT
Start: 2018-09-04 | End: 2018-09-11

## 2018-09-04 RX ORDER — ASPIRIN 81 MG/1
81 TABLET, CHEWABLE ORAL DAILY
COMMUNITY

## 2018-09-04 RX ORDER — GUAIFENESIN 600 MG/1
600 TABLET, EXTENDED RELEASE ORAL 2 TIMES DAILY
Qty: 28 TABLET | Refills: 0 | Status: SHIPPED | OUTPATIENT
Start: 2018-09-04 | End: 2018-09-18

## 2018-09-04 NOTE — PROGRESS NOTES
Subjective:     Alex Schafer is a 47 y.o.     Cough   The current episode started 1 to 4 weeks ago (feels like weight on chest, was seen by cardiology). The cough is non-productive. Associated symptoms include ear congestion, ear pain (mild), a fever, postnasal drip, shortness of breath (at times) and wheezing (at times). Pertinent negatives include no myalgias, nasal congestion or sore throat. Treatments tried: coricdin hbp. The treatment provided mild relief.         The following portions of the patient's history were reviewed and updated as appropriate: allergies, current medications, past family history, past medical history, past social history, past surgical history and problem list.      Review of Systems   Constitutional: Positive for fever.   HENT: Positive for congestion, ear pain (mild) and postnasal drip. Negative for sore throat.    Respiratory: Positive for cough, shortness of breath (at times) and wheezing (at times).    Cardiovascular:        Hx: hypertension, reported heart murmur, see history   Musculoskeletal: Negative for myalgias.         Objective:      Physical Exam   Constitutional:   coughing intermittently t/o visit   HENT:   Head: Normocephalic and atraumatic.   Right Ear: Ear canal normal. Right ear middle ear effusion: mild serous.   Left Ear: Ear canal normal.  No middle ear effusion.   Post nasal drainage noted   Cardiovascular: Normal rate, regular rhythm, S1 normal, S2 normal and normal heart sounds.    Pulmonary/Chest: He has decreased breath sounds (mildly). He has rhonchi in the right upper field, the right middle field and the left upper field.   Vitals reviewed.          Diagnoses and all orders for this visit:    Bronchitis    Other orders  -     predniSONE (DELTASONE) 20 MG tablet; Prednisone 20mg tabs, 3 for 3 days, 2 for 3 days, 1 for 3 days, 1/2 for 3 days take with food or milk  -     doxycycline (MONODOX) 100 MG capsule; Take 1 capsule by mouth 2 (Two) Times a Day for 7  days.  -     benzonatate (TESSALON PERLES) 100 MG capsule; 1 to 2 capsules 3 times a day  -     guaiFENesin (MUCINEX) 600 MG 12 hr tablet; Take 1 tablet by mouth 2 (Two) Times a Day for 14 days.    If symptoms worsen or persist follow up with a higher level of care where and ekg and CXR can be obtained

## 2018-12-11 ENCOUNTER — OFFICE VISIT (OUTPATIENT)
Dept: FAMILY MEDICINE CLINIC | Facility: CLINIC | Age: 48
End: 2018-12-11

## 2018-12-11 VITALS
TEMPERATURE: 98.8 F | DIASTOLIC BLOOD PRESSURE: 110 MMHG | HEART RATE: 79 BPM | BODY MASS INDEX: 44.1 KG/M2 | WEIGHT: 315 LBS | RESPIRATION RATE: 16 BRPM | HEIGHT: 71 IN | SYSTOLIC BLOOD PRESSURE: 190 MMHG

## 2018-12-11 DIAGNOSIS — E78.2 MIXED HYPERLIPIDEMIA: ICD-10-CM

## 2018-12-11 DIAGNOSIS — G47.33 OBSTRUCTIVE SLEEP APNEA SYNDROME: ICD-10-CM

## 2018-12-11 DIAGNOSIS — R73.01 IFG (IMPAIRED FASTING GLUCOSE): ICD-10-CM

## 2018-12-11 DIAGNOSIS — I10 ESSENTIAL HYPERTENSION: Primary | ICD-10-CM

## 2018-12-11 PROCEDURE — 99214 OFFICE O/P EST MOD 30 MIN: CPT | Performed by: FAMILY MEDICINE

## 2018-12-11 RX ORDER — AMLODIPINE BESYLATE 5 MG/1
5 TABLET ORAL DAILY
Qty: 30 TABLET | Refills: 5 | Status: SHIPPED | OUTPATIENT
Start: 2018-12-11 | End: 2019-06-19

## 2018-12-11 RX ORDER — HYDROCHLOROTHIAZIDE 25 MG/1
25 TABLET ORAL DAILY
Qty: 30 TABLET | Refills: 5 | Status: SHIPPED | OUTPATIENT
Start: 2018-12-11 | End: 2019-06-19 | Stop reason: SDUPTHER

## 2018-12-11 RX ORDER — CARVEDILOL 25 MG/1
25 TABLET ORAL 2 TIMES DAILY WITH MEALS
Qty: 60 TABLET | Refills: 5 | Status: SHIPPED | OUTPATIENT
Start: 2018-12-11 | End: 2019-06-19 | Stop reason: SDUPTHER

## 2018-12-11 NOTE — PROGRESS NOTES
"Subjective   Alex Schafer is a 48 y.o. male.     History of Present Illness     Chief Complaint:   Chief Complaint   Patient presents with   • Hypertension       Alex Schafer 48 y.o. male who presents today for Medical Management of the below listed issues and medication refills.  he has a problem list of   Patient Active Problem List   Diagnosis   • Hyperlipidemia   • Hypertension   • GERD (gastroesophageal reflux disease)   • Morbid obesity with body mass index of 60.0-69.9 in adult (CMS/[facility])   • Sleep apnea   • IFG (impaired fasting glucose)   .  Since the last visit, he has been also seeing cardiology due to the lability of the BPs and is trying to watch his salt intake better, although still learning. Uses his CPAP nightly BUT hasn't hasn't had this titrated in 10 years.  he has been compliant with   Current Outpatient Medications:   •  amLODIPine (NORVASC) 5 MG tablet, Take 1 tablet by mouth Daily., Disp: 30 tablet, Rfl: 5  •  aspirin 81 MG chewable tablet, Chew 81 mg Daily., Disp: , Rfl:   •  carvedilol (COREG) 25 MG tablet, Take 1 tablet by mouth 2 (Two) Times a Day With Meals., Disp: 60 tablet, Rfl: 5  •  hydrochlorothiazide (HYDRODIURIL) 25 MG tablet, Take 1 tablet by mouth Daily., Disp: 30 tablet, Rfl: 5.  he denies medication side effects.    All of the chronic condition(s) listed above are stable w/o issues.    BP (!) 190/110   Pulse 79   Temp 98.8 °F (37.1 °C) (Oral)   Resp 16   Ht 180.3 cm (71\")   Wt (!) 209 kg (460 lb)   BMI 64.16 kg/m²     Results for orders placed or performed in visit on 06/12/18   Comprehensive metabolic panel   Result Value Ref Range    Glucose 124 (H) 65 - 99 mg/dL    BUN 17 6 - 20 mg/dL    Creatinine 1.16 0.76 - 1.27 mg/dL    eGFR Non African Am 67 >60 mL/min/1.73    eGFR African Am 81 >60 mL/min/1.73    BUN/Creatinine Ratio 14.7 7.0 - 25.0    Sodium 143 136 - 145 mmol/L    Potassium 4.0 3.5 - 5.2 mmol/L    Chloride 103 98 - 107 mmol/L    Total CO2 27.3 22.0 - 29.0 mmol/L "    Calcium 9.8 8.6 - 10.5 mg/dL    Total Protein 7.9 6.0 - 8.5 g/dL    Albumin 4.70 3.50 - 5.20 g/dL    Globulin 3.2 gm/dL    A/G Ratio 1.5 g/dL    Total Bilirubin 0.5 0.1 - 1.2 mg/dL    Alkaline Phosphatase 89 39 - 117 U/L    AST (SGOT) 39 1 - 40 U/L    ALT (SGPT) 72 (H) 1 - 41 U/L   Lipid panel   Result Value Ref Range    Total Cholesterol 212 (H) 0 - 200 mg/dL    Triglycerides 124 0 - 150 mg/dL    HDL Cholesterol 33 (L) 40 - 60 mg/dL    VLDL Cholesterol 24.8 5 - 40 mg/dL    LDL Cholesterol  154 (H) 0 - 100 mg/dL   TSH   Result Value Ref Range    TSH 1.980 0.270 - 4.200 mIU/mL   PSA   Result Value Ref Range    PSA 0.558 0.000 - 4.000 ng/mL   CBC and Differential   Result Value Ref Range    WBC 7.81 4.50 - 10.70 10*3/mm3    RBC 4.57 (L) 4.60 - 6.00 10*6/mm3    Hemoglobin 14.8 13.7 - 17.6 g/dL    Hematocrit 45.2 40.4 - 52.2 %    MCV 98.9 (H) 79.8 - 96.2 fL    MCH 32.4 27.0 - 32.7 pg    MCHC 32.7 32.6 - 36.4 g/dL    RDW 13.0 11.5 - 14.5 %    Platelets 236 140 - 500 10*3/mm3    Neutrophil Rel % 61.6 42.7 - 76.0 %    Lymphocyte Rel % 27.3 19.6 - 45.3 %    Monocyte Rel % 9.1 5.0 - 12.0 %    Eosinophil Rel % 1.7 0.3 - 6.2 %    Basophil Rel % 0.3 0.0 - 1.5 %    Neutrophils Absolute 4.82 1.90 - 8.10 10*3/mm3    Lymphocytes Absolute 2.13 0.90 - 4.80 10*3/mm3    Monocytes Absolute 0.71 0.20 - 1.20 10*3/mm3    Eosinophils Absolute 0.13 0.00 - 0.70 10*3/mm3    Basophils Absolute 0.02 0.00 - 0.20 10*3/mm3    Immature Granulocyte Rel % 0.6 (H) 0.0 - 0.5 %    Immature Grans Absolute 0.05 (H) 0.00 - 0.03 10*3/mm3           The following portions of the patient's history were reviewed and updated as appropriate: allergies, current medications, past family history, past medical history, past social history, past surgical history and problem list.    Review of Systems   Constitutional: Negative for activity change, chills, fatigue and fever.   Respiratory: Negative for cough and shortness of breath.    Cardiovascular: Negative for  chest pain and palpitations.   Gastrointestinal: Negative for abdominal pain.   Endocrine: Negative for cold intolerance.   Psychiatric/Behavioral: Negative for behavioral problems and dysphoric mood. The patient is not nervous/anxious.        Objective   Physical Exam   Constitutional: He appears well-developed and well-nourished.   Neck: Neck supple. No thyromegaly present.   Cardiovascular: Normal rate and regular rhythm.   No murmur heard.  Pulmonary/Chest: Effort normal and breath sounds normal.   Abdominal: Bowel sounds are normal. There is no tenderness.   Psychiatric: He has a normal mood and affect. His behavior is normal.   Nursing note and vitals reviewed.      Assessment/Plan   Alex was seen today for hypertension.    Diagnoses and all orders for this visit:    Essential hypertension  Comments:  severe, uncontrolled  Orders:  -     carvedilol (COREG) 25 MG tablet; Take 1 tablet by mouth 2 (Two) Times a Day With Meals.  -     hydrochlorothiazide (HYDRODIURIL) 25 MG tablet; Take 1 tablet by mouth Daily.  -     Comprehensive Metabolic Panel  -     Lipid Panel  -     amLODIPine (NORVASC) 5 MG tablet; Take 1 tablet by mouth Daily.    Mixed hyperlipidemia  -     Lipid Panel    IFG (impaired fasting glucose)  -     Comprehensive Metabolic Panel  -     Hemoglobin A1c    Obstructive sleep apnea syndrome

## 2018-12-28 ENCOUNTER — OFFICE VISIT (OUTPATIENT)
Dept: RETAIL CLINIC | Facility: CLINIC | Age: 48
End: 2018-12-28

## 2018-12-28 VITALS
RESPIRATION RATE: 18 BRPM | SYSTOLIC BLOOD PRESSURE: 160 MMHG | DIASTOLIC BLOOD PRESSURE: 88 MMHG | HEART RATE: 96 BPM | TEMPERATURE: 99.9 F | OXYGEN SATURATION: 98 %

## 2018-12-28 DIAGNOSIS — J40 BRONCHITIS: ICD-10-CM

## 2018-12-28 DIAGNOSIS — J32.9 SINUSITIS, UNSPECIFIED CHRONICITY, UNSPECIFIED LOCATION: Primary | ICD-10-CM

## 2018-12-28 PROCEDURE — 99213 OFFICE O/P EST LOW 20 MIN: CPT | Performed by: NURSE PRACTITIONER

## 2018-12-28 RX ORDER — BENZONATATE 100 MG/1
CAPSULE ORAL
Qty: 30 CAPSULE | Refills: 0 | Status: SHIPPED | OUTPATIENT
Start: 2018-12-28 | End: 2019-06-19

## 2018-12-28 RX ORDER — DOXYCYCLINE 100 MG/1
100 CAPSULE ORAL 2 TIMES DAILY
Qty: 14 CAPSULE | Refills: 0 | Status: SHIPPED | OUTPATIENT
Start: 2018-12-28 | End: 2019-01-04

## 2018-12-28 RX ORDER — GUAIFENESIN 600 MG/1
600 TABLET, EXTENDED RELEASE ORAL 2 TIMES DAILY
Qty: 28 TABLET | Refills: 0 | Status: SHIPPED | OUTPATIENT
Start: 2018-12-28 | End: 2019-01-11

## 2018-12-28 RX ORDER — BROMPHENIRAMINE MALEATE, PSEUDOEPHEDRINE HYDROCHLORIDE, AND DEXTROMETHORPHAN HYDROBROMIDE 2; 30; 10 MG/5ML; MG/5ML; MG/5ML
SYRUP ORAL
Qty: 240 ML | Refills: 0 | Status: SHIPPED | OUTPATIENT
Start: 2018-12-28 | End: 2019-06-19

## 2018-12-28 NOTE — PROGRESS NOTES
Subjective:     Alex Schafer is a 48 y.o.     URI    Chronicity: concerned related to history of pneumonial times 3. The current episode started 1 to 4 weeks ago. The maximum temperature recorded prior to his arrival was 101 - 101.9 F. Associated symptoms include headaches, nausea (from mucous), a plugged ear sensation, sinus pain, a sore throat and vomiting (from nausea, from mucous). Congestion: green. Cough: coughing up yellow. Treatments tried: dayquil,  ibuprofen, nyquil. The treatment provided mild relief.         The following portions of the patient's history were reviewed and updated as appropriate: allergies, current medications, past family history, past medical history, past social history, past surgical history and problem list.      Review of Systems   HENT: Positive for postnasal drip, sinus pain and sore throat. Congestion: green.    Respiratory: Cough: coughing up yellow.    Cardiovascular:        See history   Gastrointestinal: Positive for nausea (from mucous) and vomiting (from nausea, from mucous).   Neurological: Positive for headaches.         Objective:      Physical Exam   Constitutional: He appears well-developed and well-nourished.   HENT:   Head: Normocephalic and atraumatic.   Right Ear: Tympanic membrane and ear canal normal.   Left Ear: Tympanic membrane and ear canal normal.   Mouth/Throat: Posterior oropharyngeal erythema present. No oropharyngeal exudate.   Nares erythematous, mild purulent nasal congestion noted, post nasal drainage noted   Cardiovascular: Normal rate, regular rhythm, S1 normal, S2 normal and normal heart sounds.   Pulmonary/Chest: He has decreased breath sounds (mildly). He has rhonchi in the right upper field and the left upper field.   couging intermittenty t/o visit   Abdominal: Soft. Normal appearance.   Lymphadenopathy:     He has no cervical adenopathy.   Vitals reviewed.          Diagnoses and all orders for this visit:    Sinusitis, unspecified chronicity,  unspecified location    Bronchitis    Other orders  -     doxycycline (MONODOX) 100 MG capsule; Take 1 capsule by mouth 2 (Two) Times a Day for 7 days.  -     brompheniramine-pseudoephedrine-DM (BROMFED DM) 30-2-10 MG/5ML syrup; 5 to 10 cc every 4 hours as needed for cough, congestion, allergies  -     benzonatate (TESSALON PERLES) 100 MG capsule; 1 to 2 capsules 3 times a day  -     guaiFENesin (MUCINEX) 600 MG 12 hr tablet; Take 1 tablet by mouth 2 (Two) Times a Day for 14 days.

## 2018-12-28 NOTE — PATIENT INSTRUCTIONS
Acute Bronchitis, Adult  Acute bronchitis is sudden (acute) swelling of the air tubes (bronchi) in the lungs. Acute bronchitis causes these tubes to fill with mucus, which can make it hard to breathe. It can also cause coughing or wheezing.  In adults, acute bronchitis usually goes away within 2 weeks. A cough caused by bronchitis may last up to 3 weeks. Smoking, allergies, and asthma can make the condition worse. Repeated episodes of bronchitis may cause further lung problems, such as chronic obstructive pulmonary disease (COPD).  What are the causes?  This condition can be caused by germs and by substances that irritate the lungs, including:  · Cold and flu viruses. This condition is most often caused by the same virus that causes a cold.  · Bacteria.  · Exposure to tobacco smoke, dust, fumes, and air pollution.    What increases the risk?  This condition is more likely to develop in people who:  · Have close contact with someone with acute bronchitis.  · Are exposed to lung irritants, such as tobacco smoke, dust, fumes, and vapors.  · Have a weak immune system.  · Have a respiratory condition such as asthma.    What are the signs or symptoms?  Symptoms of this condition include:  · A cough.  · Coughing up clear, yellow, or green mucus.  · Wheezing.  · Chest congestion.  · Shortness of breath.  · A fever.  · Body aches.  · Chills.  · A sore throat.    How is this diagnosed?  This condition is usually diagnosed with a physical exam. During the exam, your health care provider may order tests, such as chest X-rays, to rule out other conditions. He or she may also:  · Test a sample of your mucus for bacterial infection.  · Check the level of oxygen in your blood. This is done to check for pneumonia.  · Do a chest X-ray or lung function testing to rule out pneumonia and other conditions.  · Perform blood tests.    Your health care provider will also ask about your symptoms and medical history.  How is this  treated?  Most cases of acute bronchitis clear up over time without treatment. Your health care provider may recommend:  · Drinking more fluids. Drinking more makes your mucus thinner, which may make it easier to breathe.  · Taking a medicine for a fever or cough.  · Taking an antibiotic medicine.  · Using an inhaler to help improve shortness of breath and to control a cough.  · Using a cool mist vaporizer or humidifier to make it easier to breathe.    Follow these instructions at home:  Medicines  · Take over-the-counter and prescription medicines only as told by your health care provider.  · If you were prescribed an antibiotic, take it as told by your health care provider. Do not stop taking the antibiotic even if you start to feel better.  General instructions  · Get plenty of rest.  · Drink enough fluids to keep your urine clear or pale yellow.  · Avoid smoking and secondhand smoke. Exposure to cigarette smoke or irritating chemicals will make bronchitis worse. If you smoke and you need help quitting, ask your health care provider. Quitting smoking will help your lungs heal faster.  · Use an inhaler, cool mist vaporizer, or humidifier as told by your health care provider.  · Keep all follow-up visits as told by your health care provider. This is important.  How is this prevented?  To lower your risk of getting this condition again:  · Wash your hands often with soap and water. If soap and water are not available, use hand .  · Avoid contact with people who have cold symptoms.  · Try not to touch your hands to your mouth, nose, or eyes.  · Make sure to get the flu shot every year.    Contact a health care provider if:  · Your symptoms do not improve in 2 weeks of treatment.  Get help right away if:  · You cough up blood.  · You have chest pain.  · You have severe shortness of breath.  · You become dehydrated.  · You faint or keep feeling like you are going to faint.  · You keep vomiting.  · You have a  severe headache.  · Your fever or chills gets worse.  This information is not intended to replace advice given to you by your health care provider. Make sure you discuss any questions you have with your health care provider.  Document Released: 01/25/2006 Document Revised: 07/12/2017 Document Reviewed: 06/07/2017  [employer] Interactive Patient Education © 2018 [employer]  Sinusitis, Adult  Sinusitis is soreness and inflammation of your sinuses. Sinuses are hollow spaces in the bones around your face. Your sinuses are located:  · Around your eyes.  · In the middle of your forehead.  · Behind your nose.  · In your cheekbones.    Your sinuses and nasal passages are lined with a stringy fluid (mucus). Mucus normally drains out of your sinuses. When your nasal tissues become inflamed or swollen, the mucus can become trapped or blocked so air cannot flow through your sinuses. This allows bacteria, viruses, and funguses to grow, which leads to infection.  Sinusitis can develop quickly and last for 7?10 days (acute) or for more than 12 weeks (chronic). Sinusitis often develops after a cold.  What are the causes?  This condition is caused by anything that creates swelling in the sinuses or stops mucus from draining, including:  · Allergies.  · Asthma.  · Bacterial or viral infection.  · Abnormally shaped bones between the nasal passages.  · Nasal growths that contain mucus (nasal polyps).  · Narrow sinus openings.  · Pollutants, such as chemicals or irritants in the air.  · A foreign object stuck in the nose.  · A fungal infection. This is rare.    What increases the risk?  The following factors may make you more likely to develop this condition:  · Having allergies or asthma.  · Having had a recent cold or respiratory tract infection.  · Having structural deformities or blockages in your nose or sinuses.  · Having a weak immune system.  · Doing a lot of swimming or diving.  · Overusing nasal sprays.  · Smoking.    What  are the signs or symptoms?  The main symptoms of this condition are pain and a feeling of pressure around the affected sinuses. Other symptoms include:  · Upper toothache.  · Earache.  · Headache.  · Bad breath.  · Decreased sense of smell and taste.  · A cough that may get worse at night.  · Fatigue.  · Fever.  · Thick drainage from your nose. The drainage is often green and it may contain pus (purulent).  · Stuffy nose or congestion.  · Postnasal drip. This is when extra mucus collects in the throat or back of the nose.  · Swelling and warmth over the affected sinuses.  · Sore throat.  · Sensitivity to light.    How is this diagnosed?  This condition is diagnosed based on symptoms, a medical history, and a physical exam. To find out if your condition is acute or chronic, your health care provider may:  · Look in your nose for signs of nasal polyps.  · Tap over the affected sinus to check for signs of infection.  · View the inside of your sinuses using an imaging device that has a light attached (endoscope).    If your health care provider suspects that you have chronic sinusitis, you may also:  · Be tested for allergies.  · Have a sample of mucus taken from your nose (nasal culture) and checked for bacteria.  · Have a mucus sample examined to see if your sinusitis is related to an allergy.    If your sinusitis does not respond to treatment and it lasts longer than 8 weeks, you may have an MRI or CT scan to check your sinuses. These scans also help to determine how severe your infection is.  In rare cases, a bone biopsy may be done to rule out more serious types of fungal sinus disease.  How is this treated?  Treatment for sinusitis depends on the cause and whether your condition is chronic or acute. If a virus is causing your sinusitis, your symptoms will go away on their own within 10 days. You may be given medicines to relieve your symptoms, including:  · Topical nasal decongestants. They shrink swollen nasal  passages and let mucus drain from your sinuses.  · Antihistamines. These drugs block inflammation that is triggered by allergies. This can help to ease swelling in your nose and sinuses.  · Topical nasal corticosteroids. These are nasal sprays that ease inflammation and swelling in your nose and sinuses.  · Nasal saline washes. These rinses can help to get rid of thick mucus in your nose.    If your condition is caused by bacteria, you will be given an antibiotic medicine. If your condition is caused by a fungus, you will be given an antifungal medicine.  Surgery may be needed to correct underlying conditions, such as narrow nasal passages. Surgery may also be needed to remove polyps.  Follow these instructions at home:  Medicines  · Take, use, or apply over-the-counter and prescription medicines only as told by your health care provider. These may include nasal sprays.  · If you were prescribed an antibiotic medicine, take it as told by your health care provider. Do not stop taking the antibiotic even if you start to feel better.  Hydrate and Humidify  · Drink enough water to keep your urine clear or pale yellow. Staying hydrated will help to thin your mucus.  · Use a cool mist humidifier to keep the humidity level in your home above 50%.  · Inhale steam for 10-15 minutes, 3-4 times a day or as told by your health care provider. You can do this in the bathroom while a hot shower is running.  · Limit your exposure to cool or dry air.  Rest  · Rest as much as possible.  · Sleep with your head raised (elevated).  · Make sure to get enough sleep each night.  General instructions  · Apply a warm, moist washcloth to your face 3-4 times a day or as told by your health care provider. This will help with discomfort.  · Wash your hands often with soap and water to reduce your exposure to viruses and other germs. If soap and water are not available, use hand .  · Do not smoke. Avoid being around people who are  smoking (secondhand smoke).  · Keep all follow-up visits as told by your health care provider. This is important.  Contact a health care provider if:  · You have a fever.  · Your symptoms get worse.  · Your symptoms do not improve within 10 days.  Get help right away if:  · You have a severe headache.  · You have persistent vomiting.  · You have pain or swelling around your face or eyes.  · You have vision problems.  · You develop confusion.  · Your neck is stiff.  · You have trouble breathing.  This information is not intended to replace advice given to you by your health care provider. Make sure you discuss any questions you have with your health care provider.  Document Released: 12/18/2006 Document Revised: 08/13/2017 Document Reviewed: 10/12/2016  [employer] Interactive Patient Education © 2018 [employer]

## 2019-03-20 ENCOUNTER — HOSPITAL ENCOUNTER (EMERGENCY)
Facility: HOSPITAL | Age: 49
Discharge: HOME | End: 2019-03-20
Attending: EMERGENCY MEDICINE | Admitting: EMERGENCY MEDICINE

## 2019-03-20 ENCOUNTER — APPOINTMENT (OUTPATIENT)
Dept: GENERAL RADIOLOGY | Facility: HOSPITAL | Age: 49
End: 2019-03-20

## 2019-03-20 ENCOUNTER — APPOINTMENT (OUTPATIENT)
Dept: CT IMAGING | Facility: HOSPITAL | Age: 49
End: 2019-03-20

## 2019-03-20 VITALS
TEMPERATURE: 98 F | SYSTOLIC BLOOD PRESSURE: 235 MMHG | RESPIRATION RATE: 18 BRPM | BODY MASS INDEX: 44.1 KG/M2 | HEART RATE: 93 BPM | HEIGHT: 71 IN | DIASTOLIC BLOOD PRESSURE: 120 MMHG | WEIGHT: 315 LBS | OXYGEN SATURATION: 97 %

## 2019-03-20 DIAGNOSIS — S20.219A CONTUSION OF STERNUM, INITIAL ENCOUNTER: ICD-10-CM

## 2019-03-20 DIAGNOSIS — S00.83XA TRAUMATIC HEMATOMA OF FOREHEAD, INITIAL ENCOUNTER: Primary | ICD-10-CM

## 2019-03-20 PROCEDURE — 71120 X-RAY EXAM BREASTBONE 2/>VWS: CPT

## 2019-03-20 PROCEDURE — 70450 CT HEAD/BRAIN W/O DYE: CPT

## 2019-03-20 PROCEDURE — 99283 EMERGENCY DEPT VISIT LOW MDM: CPT

## 2019-03-20 RX ORDER — CLONIDINE HYDROCHLORIDE 0.1 MG/1
0.2 TABLET ORAL ONCE
Status: COMPLETED | OUTPATIENT
Start: 2019-03-20 | End: 2019-03-20

## 2019-03-20 RX ORDER — HYDROCODONE BITARTRATE AND ACETAMINOPHEN 5; 325 MG/1; MG/1
1 TABLET ORAL EVERY 6 HOURS PRN
Qty: 10 TABLET | Refills: 0 | Status: SHIPPED | OUTPATIENT
Start: 2019-03-20 | End: 2019-06-19

## 2019-03-20 RX ADMIN — CLONIDINE HYDROCHLORIDE 0.2 MG: 0.1 TABLET ORAL at 01:20
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
## 2019-06-19 ENCOUNTER — OFFICE VISIT (OUTPATIENT)
Dept: FAMILY MEDICINE CLINIC | Facility: CLINIC | Age: 49
End: 2019-06-19

## 2019-06-19 VITALS
WEIGHT: 315 LBS | DIASTOLIC BLOOD PRESSURE: 104 MMHG | BODY MASS INDEX: 44.1 KG/M2 | HEART RATE: 88 BPM | HEIGHT: 71 IN | SYSTOLIC BLOOD PRESSURE: 190 MMHG | TEMPERATURE: 99.7 F | RESPIRATION RATE: 18 BRPM

## 2019-06-19 DIAGNOSIS — Z12.5 SCREENING FOR PROSTATE CANCER: ICD-10-CM

## 2019-06-19 DIAGNOSIS — I10 ESSENTIAL HYPERTENSION: Primary | ICD-10-CM

## 2019-06-19 DIAGNOSIS — E78.2 MIXED HYPERLIPIDEMIA: ICD-10-CM

## 2019-06-19 DIAGNOSIS — R73.01 IFG (IMPAIRED FASTING GLUCOSE): ICD-10-CM

## 2019-06-19 PROCEDURE — 99214 OFFICE O/P EST MOD 30 MIN: CPT | Performed by: FAMILY MEDICINE

## 2019-06-19 RX ORDER — AMLODIPINE BESYLATE 5 MG/1
5 TABLET ORAL DAILY
Qty: 30 TABLET | Refills: 5 | Status: CANCELLED | OUTPATIENT
Start: 2019-06-19

## 2019-06-19 RX ORDER — VALSARTAN 320 MG/1
320 TABLET ORAL DAILY
Qty: 30 TABLET | Refills: 5 | Status: SHIPPED | OUTPATIENT
Start: 2019-06-19 | End: 2019-12-16 | Stop reason: SDUPTHER

## 2019-06-19 RX ORDER — CARVEDILOL 25 MG/1
25 TABLET ORAL 2 TIMES DAILY WITH MEALS
Qty: 60 TABLET | Refills: 5 | Status: SHIPPED | OUTPATIENT
Start: 2019-06-19 | End: 2019-12-16 | Stop reason: SDUPTHER

## 2019-06-19 RX ORDER — HYDROCHLOROTHIAZIDE 25 MG/1
25 TABLET ORAL DAILY
Qty: 30 TABLET | Refills: 5 | Status: SHIPPED | OUTPATIENT
Start: 2019-06-19 | End: 2019-12-16 | Stop reason: SDUPTHER

## 2019-06-19 RX ORDER — AMLODIPINE BESYLATE 5 MG/1
5 TABLET ORAL DAILY
Qty: 30 TABLET | Refills: 5 | Status: SHIPPED | OUTPATIENT
Start: 2019-06-19 | End: 2019-12-16 | Stop reason: SDUPTHER

## 2019-06-19 NOTE — PROGRESS NOTES
"Subjective   Alex Schafer is a 48 y.o. male.     History of Present Illness     Chief Complaint:   Chief Complaint   Patient presents with   • Hypertension     med refill   - no labs   • Hyperlipidemia     NO BP MEDS TODAY - NEW PHARM    • IFG       Alex Schafer 48 y.o. male who presents today for Medical Management of the below listed issues and medication refills.  he has a problem list of   Patient Active Problem List   Diagnosis   • Hyperlipidemia   • Hypertension   • GERD (gastroesophageal reflux disease)   • Morbid obesity with body mass index of 60.0-69.9 in adult (CMS/[facility])   • Sleep apnea   • IFG (impaired fasting glucose)   .  Since the last visit, he has overall felt well. In discussion, pt had a mild rash only with the Lisinopril, and is not even sure that happened prior. he has been compliant with   Current Outpatient Medications:   •  amLODIPine (NORVASC) 5 MG tablet, Take 1 tablet by mouth Daily., Disp: 30 tablet, Rfl: 5  •  aspirin 81 MG chewable tablet, Chew 81 mg Daily., Disp: , Rfl:   •  carvedilol (COREG) 25 MG tablet, Take 1 tablet by mouth 2 (Two) Times a Day With Meals., Disp: 60 tablet, Rfl: 5  •  hydrochlorothiazide (HYDRODIURIL) 25 MG tablet, Take 1 tablet by mouth Daily., Disp: 30 tablet, Rfl: 5  •  valsartan (DIOVAN) 320 MG tablet, Take 1 tablet by mouth Daily., Disp: 30 tablet, Rfl: 5.  he denies medication side effects.    All of the chronic condition(s) listed above are stable w/o issues.    BP (!) 190/104   Pulse 88   Temp 99.7 °F (37.6 °C) (Oral)   Resp 18   Ht 180.3 cm (71\")   Wt (!) 211 kg (465 lb)   BMI 64.85 kg/m²     Results for orders placed or performed in visit on 06/12/18   Comprehensive metabolic panel   Result Value Ref Range    Glucose 124 (H) 65 - 99 mg/dL    BUN 17 6 - 20 mg/dL    Creatinine 1.16 0.76 - 1.27 mg/dL    eGFR Non African Am 67 >60 mL/min/1.73    eGFR African Am 81 >60 mL/min/1.73    BUN/Creatinine Ratio 14.7 7.0 - 25.0    Sodium 143 136 - 145 mmol/L    " Potassium 4.0 3.5 - 5.2 mmol/L    Chloride 103 98 - 107 mmol/L    Total CO2 27.3 22.0 - 29.0 mmol/L    Calcium 9.8 8.6 - 10.5 mg/dL    Total Protein 7.9 6.0 - 8.5 g/dL    Albumin 4.70 3.50 - 5.20 g/dL    Globulin 3.2 gm/dL    A/G Ratio 1.5 g/dL    Total Bilirubin 0.5 0.1 - 1.2 mg/dL    Alkaline Phosphatase 89 39 - 117 U/L    AST (SGOT) 39 1 - 40 U/L    ALT (SGPT) 72 (H) 1 - 41 U/L   Lipid panel   Result Value Ref Range    Total Cholesterol 212 (H) 0 - 200 mg/dL    Triglycerides 124 0 - 150 mg/dL    HDL Cholesterol 33 (L) 40 - 60 mg/dL    VLDL Cholesterol 24.8 5 - 40 mg/dL    LDL Cholesterol  154 (H) 0 - 100 mg/dL   TSH   Result Value Ref Range    TSH 1.980 0.270 - 4.200 mIU/mL   PSA   Result Value Ref Range    PSA 0.558 0.000 - 4.000 ng/mL   CBC and Differential   Result Value Ref Range    WBC 7.81 4.50 - 10.70 10*3/mm3    RBC 4.57 (L) 4.60 - 6.00 10*6/mm3    Hemoglobin 14.8 13.7 - 17.6 g/dL    Hematocrit 45.2 40.4 - 52.2 %    MCV 98.9 (H) 79.8 - 96.2 fL    MCH 32.4 27.0 - 32.7 pg    MCHC 32.7 32.6 - 36.4 g/dL    RDW 13.0 11.5 - 14.5 %    Platelets 236 140 - 500 10*3/mm3    Neutrophil Rel % 61.6 42.7 - 76.0 %    Lymphocyte Rel % 27.3 19.6 - 45.3 %    Monocyte Rel % 9.1 5.0 - 12.0 %    Eosinophil Rel % 1.7 0.3 - 6.2 %    Basophil Rel % 0.3 0.0 - 1.5 %    Neutrophils Absolute 4.82 1.90 - 8.10 10*3/mm3    Lymphocytes Absolute 2.13 0.90 - 4.80 10*3/mm3    Monocytes Absolute 0.71 0.20 - 1.20 10*3/mm3    Eosinophils Absolute 0.13 0.00 - 0.70 10*3/mm3    Basophils Absolute 0.02 0.00 - 0.20 10*3/mm3    Immature Granulocyte Rel % 0.6 (H) 0.0 - 0.5 %    Immature Grans Absolute 0.05 (H) 0.00 - 0.03 10*3/mm3           The following portions of the patient's history were reviewed and updated as appropriate: allergies, current medications, past family history, past medical history, past social history, past surgical history and problem list.    Review of Systems   Constitutional: Negative for activity change, chills, fatigue and  fever.   Respiratory: Negative for cough and shortness of breath.    Cardiovascular: Negative for chest pain and palpitations.   Gastrointestinal: Negative for abdominal pain.   Endocrine: Negative for cold intolerance.   Psychiatric/Behavioral: Negative for behavioral problems and dysphoric mood. The patient is not nervous/anxious.        Objective   Physical Exam   Constitutional: He appears well-developed and well-nourished.   Neck: Neck supple. No thyromegaly present.   Cardiovascular: Normal rate and regular rhythm.   No murmur heard.  Pulmonary/Chest: Effort normal and breath sounds normal.   Abdominal: Bowel sounds are normal. There is no tenderness.   Psychiatric: He has a normal mood and affect. His behavior is normal.   Nursing note and vitals reviewed.      Assessment/Plan   Alex was seen today for hypertension, hyperlipidemia and ifg.    Diagnoses and all orders for this visit:    Essential hypertension  Comments:  severe, uncontrolled  Orders:  -     carvedilol (COREG) 25 MG tablet; Take 1 tablet by mouth 2 (Two) Times a Day With Meals.  -     hydrochlorothiazide (HYDRODIURIL) 25 MG tablet; Take 1 tablet by mouth Daily.  -     Comprehensive metabolic panel  -     Lipid panel  -     CBC and Differential  -     TSH  -     amLODIPine (NORVASC) 5 MG tablet; Take 1 tablet by mouth Daily.  -     valsartan (DIOVAN) 320 MG tablet; Take 1 tablet by mouth Daily.    IFG (impaired fasting glucose)  -     Comprehensive metabolic panel  -     Hemoglobin A1c    Mixed hyperlipidemia  -     Lipid panel    Screening for prostate cancer  -     PSA    Other orders  -     Cancel: amLODIPine (NORVASC) 5 MG tablet; Take 1 tablet by mouth Daily.    Pt to check in with his sleep MD to make sure his machine doesn't need to be adjusted.

## 2019-06-22 LAB
ALBUMIN SERPL-MCNC: 5.2 G/DL (ref 3.5–5.2)
ALBUMIN/GLOB SERPL: 2 G/DL
ALP SERPL-CCNC: 87 U/L (ref 39–117)
ALT SERPL-CCNC: 53 U/L (ref 1–41)
AST SERPL-CCNC: 32 U/L (ref 1–40)
BASOPHILS # BLD AUTO: 0.04 10*3/MM3 (ref 0–0.2)
BASOPHILS NFR BLD AUTO: 0.5 % (ref 0–1.5)
BILIRUB SERPL-MCNC: 0.8 MG/DL (ref 0.2–1.2)
BUN SERPL-MCNC: 21 MG/DL (ref 6–20)
BUN/CREAT SERPL: 18.9 (ref 7–25)
CALCIUM SERPL-MCNC: 9.5 MG/DL (ref 8.6–10.5)
CHLORIDE SERPL-SCNC: 98 MMOL/L (ref 98–107)
CHOLEST SERPL-MCNC: 203 MG/DL (ref 0–200)
CO2 SERPL-SCNC: 25.4 MMOL/L (ref 22–29)
CREAT SERPL-MCNC: 1.11 MG/DL (ref 0.76–1.27)
EOSINOPHIL # BLD AUTO: 0.24 10*3/MM3 (ref 0–0.4)
EOSINOPHIL NFR BLD AUTO: 2.7 % (ref 0.3–6.2)
ERYTHROCYTE [DISTWIDTH] IN BLOOD BY AUTOMATED COUNT: 12.8 % (ref 12.3–15.4)
GLOBULIN SER CALC-MCNC: 2.6 GM/DL
GLUCOSE SERPL-MCNC: 101 MG/DL (ref 65–99)
HBA1C MFR BLD: 5.5 % (ref 4.8–5.6)
HCT VFR BLD AUTO: 48.9 % (ref 37.5–51)
HDLC SERPL-MCNC: 41 MG/DL (ref 40–60)
HGB BLD-MCNC: 15.8 G/DL (ref 13–17.7)
IMM GRANULOCYTES # BLD AUTO: 0.05 10*3/MM3 (ref 0–0.05)
IMM GRANULOCYTES NFR BLD AUTO: 0.6 % (ref 0–0.5)
LDLC SERPL CALC-MCNC: 139 MG/DL (ref 0–100)
LYMPHOCYTES # BLD AUTO: 2.8 10*3/MM3 (ref 0.7–3.1)
LYMPHOCYTES NFR BLD AUTO: 32 % (ref 19.6–45.3)
MCH RBC QN AUTO: 31 PG (ref 26.6–33)
MCHC RBC AUTO-ENTMCNC: 32.3 G/DL (ref 31.5–35.7)
MCV RBC AUTO: 95.9 FL (ref 79–97)
MONOCYTES # BLD AUTO: 0.64 10*3/MM3 (ref 0.1–0.9)
MONOCYTES NFR BLD AUTO: 7.3 % (ref 5–12)
NEUTROPHILS # BLD AUTO: 4.97 10*3/MM3 (ref 1.7–7)
NEUTROPHILS NFR BLD AUTO: 56.9 % (ref 42.7–76)
NRBC BLD AUTO-RTO: 0 /100 WBC (ref 0–0.2)
PLATELET # BLD AUTO: 303 10*3/MM3 (ref 140–450)
POTASSIUM SERPL-SCNC: 3.9 MMOL/L (ref 3.5–5.2)
PROT SERPL-MCNC: 7.8 G/DL (ref 6–8.5)
PSA SERPL-MCNC: 0.44 NG/ML (ref 0–4)
RBC # BLD AUTO: 5.1 10*6/MM3 (ref 4.14–5.8)
SODIUM SERPL-SCNC: 139 MMOL/L (ref 136–145)
TRIGL SERPL-MCNC: 113 MG/DL (ref 0–150)
TSH SERPL DL<=0.005 MIU/L-ACNC: 3.67 MIU/ML (ref 0.27–4.2)
VLDLC SERPL CALC-MCNC: 22.6 MG/DL
WBC # BLD AUTO: 8.74 10*3/MM3 (ref 3.4–10.8)

## 2019-07-03 ENCOUNTER — OFFICE VISIT (OUTPATIENT)
Dept: FAMILY MEDICINE CLINIC | Facility: CLINIC | Age: 49
End: 2019-07-03

## 2019-07-03 VITALS
WEIGHT: 315 LBS | RESPIRATION RATE: 16 BRPM | DIASTOLIC BLOOD PRESSURE: 106 MMHG | TEMPERATURE: 98.7 F | SYSTOLIC BLOOD PRESSURE: 180 MMHG | OXYGEN SATURATION: 97 % | BODY MASS INDEX: 44.1 KG/M2 | HEIGHT: 71 IN | HEART RATE: 80 BPM

## 2019-07-03 DIAGNOSIS — R51.9 CHRONIC NONINTRACTABLE HEADACHE, UNSPECIFIED HEADACHE TYPE: Primary | ICD-10-CM

## 2019-07-03 DIAGNOSIS — G89.29 CHRONIC NONINTRACTABLE HEADACHE, UNSPECIFIED HEADACHE TYPE: Primary | ICD-10-CM

## 2019-07-03 DIAGNOSIS — I10 ESSENTIAL HYPERTENSION: ICD-10-CM

## 2019-07-03 PROCEDURE — 99214 OFFICE O/P EST MOD 30 MIN: CPT | Performed by: FAMILY MEDICINE

## 2019-07-03 RX ORDER — HYDRALAZINE HYDROCHLORIDE 10 MG/1
10 TABLET, FILM COATED ORAL 3 TIMES DAILY
Qty: 120 TABLET | Refills: 5 | Status: SHIPPED | OUTPATIENT
Start: 2019-07-03 | End: 2019-09-12 | Stop reason: SDUPTHER

## 2019-07-03 RX ORDER — TOPIRAMATE 25 MG/1
25 TABLET ORAL 2 TIMES DAILY
Qty: 60 TABLET | Refills: 5 | Status: SHIPPED | OUTPATIENT
Start: 2019-07-03 | End: 2019-12-16 | Stop reason: SDUPTHER
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
## 2019-07-11 ENCOUNTER — OFFICE VISIT (OUTPATIENT)
Dept: SLEEP MEDICINE | Facility: HOSPITAL | Age: 49
End: 2019-07-11

## 2019-07-11 VITALS
DIASTOLIC BLOOD PRESSURE: 82 MMHG | BODY MASS INDEX: 44.1 KG/M2 | WEIGHT: 315 LBS | HEART RATE: 74 BPM | HEIGHT: 71 IN | OXYGEN SATURATION: 96 % | SYSTOLIC BLOOD PRESSURE: 173 MMHG

## 2019-07-11 DIAGNOSIS — E66.01 MORBID OBESITY WITH BODY MASS INDEX OF 60.0-69.9 IN ADULT (HCC): ICD-10-CM

## 2019-07-11 DIAGNOSIS — G47.33 OSA ON CPAP: Primary | ICD-10-CM

## 2019-07-11 DIAGNOSIS — Z99.89 OSA ON CPAP: Primary | ICD-10-CM

## 2019-07-11 DIAGNOSIS — I10 ESSENTIAL HYPERTENSION: ICD-10-CM

## 2019-07-11 PROCEDURE — 99204 OFFICE O/P NEW MOD 45 MIN: CPT | Performed by: INTERNAL MEDICINE

## 2019-07-11 PROCEDURE — G0463 HOSPITAL OUTPT CLINIC VISIT: HCPCS

## 2019-07-11 NOTE — PROGRESS NOTES
[facility]  [address] [address]  Phone   Fax       Alex Schafer  1970  48 y.o.  male      PCP:Edwin Modi MD  Referring Provider same    Type of service: Initial consult      Chief Complaint   Patient presents with   • Sleep Apnea   • Snoring   • Daytime Sleepiness       History of present illness;  Thank you for asking to see Alex Schafer, 48 y.o.  for evaluation of sleep apnea. The patient was seen today on 7/11/2019 at [facility].  Patient has a history of sleep apnea he is on CPAP for the past 18 years.  I believe that his test was in 2001 or 2002.  Since then his CPAP has been not checked and it does not have any smartcard.  He is having weight gain of 50 pounds.  In addition patient works third shift.  Is also having difficulty in controlling blood pressure.  It fluctuates a lot.  He does complain of headaches and still feels that he is not rested when he wakes up.    Patient gives the following sleep history.  Sleep schedule:  Bedtime: 9:52 AM   Wake time: 5:57 PM   Normally takes about 30  minutes to fall asleep  Average hours of sleep 5-7  Number of naps per day none  When patient wakes up still feels tired and not rested  Snoring yes  Witnessed apneas on the CPAP  He works third shift works between 6:30 PM to 7 AM      Past Medical History:   Diagnosis Date   • Abdominal pain    • Asthma, exercise induced    • Back pain    • Bipolar disorder (CMS/HCC)    • Chest pain    • Chronic peptic ulcer    • Depression    • Diarrhea    • Difficulty sleeping    • Diverticulitis    • Dizziness    • Erectile dysfunction    • Fatigue    • Fever    • Gas    • GERD (gastroesophageal reflux disease)    • Headache    • Hepatitis A virus infection    • Hyperlipidemia    • Hypertension    • Irregular heart beat    • Itching    • Leg swelling    • Nausea    • Nervousness    • Palpitations    • Pneumonia    • Rectal bleeding    •  "Renal failure     \"small\"   • Seasonal allergies    • Sleep apnea    • Stomach cramps    • Vomiting        Social history:  Shift work: Yes  Tobacco use: Quit smoking long time ago  Alcohol use: 1-2 a month  Caffeinated drinks: 2 sodas  Over-the-counter sleeping aid and medications: No  Narcotic medications: No    Family Hx  Family history of sleep apnea yes  Family History   Problem Relation Age of Onset   • Hypertension Mother    • Ovarian cancer Mother    • Colon polyps Mother    • Other Mother    • Other Father         Cardiac Pacemaker   • Hypertension Father    • Heart disease Father    • Ovarian cancer Sister    • Stroke Maternal Grandmother         CVA   • Prostate cancer Maternal Grandfather    • Heart attack Paternal Grandfather    • Heart disease Paternal Grandfather        Medications: reviewed    Current Outpatient Medications:   •  amLODIPine (NORVASC) 5 MG tablet, Take 1 tablet by mouth Daily., Disp: 30 tablet, Rfl: 5  •  aspirin 81 MG chewable tablet, Chew 81 mg Daily., Disp: , Rfl:   •  carvedilol (COREG) 25 MG tablet, Take 1 tablet by mouth 2 (Two) Times a Day With Meals., Disp: 60 tablet, Rfl: 5  •  hydrALAZINE (APRESOLINE) 10 MG tablet, Take 1 tablet by mouth 3 (Three) Times a Day., Disp: 120 tablet, Rfl: 5  •  hydrochlorothiazide (HYDRODIURIL) 25 MG tablet, Take 1 tablet by mouth Daily., Disp: 30 tablet, Rfl: 5  •  topiramate (TOPAMAX) 25 MG tablet, Take 1 tablet by mouth 2 (Two) Times a Day., Disp: 60 tablet, Rfl: 5  •  valsartan (DIOVAN) 320 MG tablet, Take 1 tablet by mouth Daily., Disp: 30 tablet, Rfl: 5    Review of systems:  [place] Sleepiness Scale: Total score: 7   Positive for snoring, fatigue and daytime excessive sleepiness,   Negative for shortness of breath, cough, wheezing, chest pain, nausea and vomiting, palpitation, swelling of feet:    Morning headaches: Yes  Awaken with sore throat or dry mouth : No  Leg jerking at night: No  Crawly feeling/urge sensation to move in the legs: " "No  Teeth grinding: No  Sleepwalking, nightmares, muscle weakness with laughing or anger,sleep paralysis: No  Nasal Congestion: No  Nocturia (how many times/night): 1  Memory Problem: No    Physical exam:  Vitals:    07/11/19 0840   BP: 173/82   Pulse: 74   SpO2: 96%   Weight: (!) 202 kg (445 lb)   Height: 180.3 cm (71\")    Body mass index is 62.06 kg/m². Neck Circumference: 22 inches  HEENT: Head is atraumatic, normocephalic   Eyes:pupils are round equal and reacting to light and accommodation, conjunctiva normal  Nose:no nasal septal defects or deviation and the nasal passages are clear, no nasal polyps,   Throat: tonsils are not enlarged, tongue normal size, oral airway Mallampati class 4  NECK: No lymphadenopathy, trachea is in the midline, thyroid not enlarged  RESPIRATORY SYSTEM: Breath sounds are equal on both sides, there are no wheezes or crackles  CARDIOVASULAR SYSTEM: Heart sounds are regular rhythm and tate rate, there are no murmurs or thrills  ABDOMEN: Soft, no hepatosplenomegaly, no evidence of ascites  EXTREMITES: No cyanosis, clubbing or edema   NEUROLOGICAL SYSTEM: Oriented x 3, no gross neurological defects, gait normal    Medical records and labs reviewed [facility]     Assessment and plan:  · Sleep apnea obstructive, (G47.33).  Patient has a history of sleep apnea was tested about 18 years ago was on CPAP.  In spite of using CPAP his symptoms are not well controlled and he does not have any smartcard to download.  In addition he has gained about 50 pounds and his blood pressure is difficult to control.  His chest circumference is very high and unable to do home sleep test.  He needs a BiPAP as he has failed CPAP.  I am going to arrange a split night sleep study and titrate the Pap therapy with a BiPAP. I have placed a order in [facility] for in lab Split night sleep test.  Patient will have a follow-up after this sleep test is done.   · Obesity, unfortunately patient has gained weight and his body mass " index is 62 with morbid obesity. I have discussed the relationship between weight and sleep apnea. Weight reduction is encouraged.  I will also discussed with the patient diet and exercise.  · Snoring secondary to sleep apnea  · Hypersomnia with [place] Sleepiness Scale of Total score: 7 due to sleep apnea.  · Hypertension.  Even though he is on medications is very difficult to control his blood pressure.  Even today his blood pressure is slightly high.  After treatment of his CPAP with a BiPAP if his blood pressure is still high I have suggested that he should see a nephrologist to get a complete work-up.  Patient is in agreement with the treatment plan.  · Shift work syndrome          Penelope Feng MD, [facility]  Sleep Medicine.(Board-certified)  [facility]  7/11/2019 ,

## 2019-08-08 ENCOUNTER — APPOINTMENT (OUTPATIENT)
Dept: SLEEP MEDICINE | Facility: HOSPITAL | Age: 49
End: 2019-08-08

## 2019-08-12 ENCOUNTER — OFFICE VISIT (OUTPATIENT)
Dept: FAMILY MEDICINE CLINIC | Facility: CLINIC | Age: 49
End: 2019-08-12

## 2019-08-12 VITALS
DIASTOLIC BLOOD PRESSURE: 98 MMHG | SYSTOLIC BLOOD PRESSURE: 156 MMHG | WEIGHT: 315 LBS | BODY MASS INDEX: 44.1 KG/M2 | TEMPERATURE: 99.9 F | RESPIRATION RATE: 20 BRPM | HEART RATE: 83 BPM | HEIGHT: 71 IN

## 2019-08-12 DIAGNOSIS — R40.20 LOC (LOSS OF CONSCIOUSNESS) (HCC): ICD-10-CM

## 2019-08-12 DIAGNOSIS — R07.9 CHEST PAIN, UNSPECIFIED TYPE: Primary | ICD-10-CM

## 2019-08-12 PROCEDURE — 99213 OFFICE O/P EST LOW 20 MIN: CPT | Performed by: FAMILY MEDICINE

## 2019-08-12 NOTE — PROGRESS NOTES
"Subjective   Alex Schafer is a 48 y.o. male.     CC: Chest Pain/LOC    History of Present Illness     Pt returns from [place] where, while waiting in line for a ride, developed increasing SSCP described as \"crushing/squeezing\". While awaiting the EMS he passed out (while seated) for unknown length of time. Pain resolved on site and, after they did a rhythm strip, pt resumed his vacation.   Pt has seen Dr Ritter, cardiology, prior.    FH: dad with CAD/Pacemaker/defibrillator    Pt is repeating a sleep study shortly as feel this may be in play regarding his BPs.    The following portions of the patient's history were reviewed and updated as appropriate: allergies, current medications, past family history, past medical history, past social history, past surgical history and problem list.    Review of Systems   Constitutional: Negative for activity change, chills, fatigue and fever.   Respiratory: Negative for cough and shortness of breath.    Cardiovascular: Negative for chest pain (none currently) and palpitations.   Gastrointestinal: Negative for abdominal pain.   Endocrine: Negative for cold intolerance.   Neurological: Negative for weakness.   Psychiatric/Behavioral: Negative for behavioral problems and dysphoric mood. The patient is not nervous/anxious.        /98   Pulse 83   Temp 99.9 °F (37.7 °C)   Resp 20   Ht 180.3 cm (70.98\")   Wt (!) 203 kg (447 lb)   BMI 62.37 kg/m²     Objective   Physical Exam   Constitutional: He appears well-developed and well-nourished.   Neck: Neck supple. No thyromegaly present.   Cardiovascular: Normal rate and regular rhythm.   No murmur heard.  Pulmonary/Chest: Effort normal and breath sounds normal.   Abdominal: Bowel sounds are normal. There is no tenderness.   Psychiatric: He has a normal mood and affect. His behavior is normal.   Nursing note and vitals reviewed.  Rhythm strip independently reviewed by me today and is WNL.    Assessment/Plan   Alex was seen " today for loss of consciousness and chest pain.    Diagnoses and all orders for this visit:    Chest pain, unspecified type  -     Ambulatory Referral to Cardiology    LOC (loss of consciousness) (CMS/[facility])  -     Ambulatory Referral to Cardiology    Pt to start ASA 81mg until sees cardiology.

## 2019-08-27 ENCOUNTER — OFFICE VISIT (OUTPATIENT)
Dept: CARDIOLOGY | Facility: CLINIC | Age: 49
End: 2019-08-27

## 2019-08-27 VITALS — BODY MASS INDEX: 45.1 KG/M2 | HEART RATE: 79 BPM | WEIGHT: 315 LBS | OXYGEN SATURATION: 98 % | HEIGHT: 70 IN

## 2019-08-27 DIAGNOSIS — I10 HYPERTENSION NOT AT GOAL: ICD-10-CM

## 2019-08-27 DIAGNOSIS — R07.2 PRECORDIAL CHEST PAIN: Primary | ICD-10-CM

## 2019-08-27 DIAGNOSIS — R55 SYNCOPE, UNSPECIFIED SYNCOPE TYPE: ICD-10-CM

## 2019-08-27 DIAGNOSIS — E66.01 MORBID OBESITY WITH BODY MASS INDEX OF 60.0-69.9 IN ADULT (HCC): ICD-10-CM

## 2019-08-27 PROCEDURE — 93000 ELECTROCARDIOGRAM COMPLETE: CPT | Performed by: NURSE PRACTITIONER

## 2019-08-27 PROCEDURE — 99214 OFFICE O/P EST MOD 30 MIN: CPT | Performed by: NURSE PRACTITIONER

## 2019-08-27 NOTE — PROGRESS NOTES
[facility]  [address]  [place] [address]  Phone: [phone]      Patient Name: Alex Schafer  :1970  Age: 48 y.o.  Primary Cardiologist: Sunday Ritter MD  Encounter Provider:  THAI Christie      Chief Complaint:   Chief Complaint   Patient presents with   • Follow-up     chest pain         HPI  Alex Schafer is a 48 y.o. male who presents today for evaluation of chest pain.     Pt has a  history significant for hypertension, hyperlipidemia, palpitations, obstructive sleep apnea on CPAP, obesity..    Pt reports that he was on vacation in [place] in July. He reports that he went inside of a building that had  when he noted chest pain.  He states that the chest pain lasted for about 15 minutes.  Pain is described as squeezing that radiated to the back.  He had associated symptoms of lightheadedness and shortness of breath.  Pain intensified and he called EMS.  Patient reports that he had not been at the park long and had not done much walking. Prior to EMS arrival he had a syncopal event.  By the time EMS arrived his pain had resolved.  They performed an ECG, I have personally reviewed tracings and my interpretation is SR with baseline artifact.  He had routine vitals and BG taken systolic blood pressure was in the 100s to 110s.  He was offered transport for further evaluation, but declined.  Patient states that the next night he had a similar episode, but it did not last long and pain was not as severe.  He had near syncope, and no syncope.  About a week after returning home there was another episode that was similar to the second episode that occurred in [place].    Since that time he has not had any further episodes.  He reports generalized fatigue that has been present since Dr. Modi started hydralazine for uncontrolled HTN.        The following portions of the patient's history were reviewed and updated as appropriate:  "allergies, current medications, past family history, past medical history, past social history, past surgical history and problem list.      Review of Systems   Constitution: Negative for malaise/fatigue.   Cardiovascular: Positive for chest pain. Negative for leg swelling.   Respiratory: Negative for shortness of breath.    Neurological: Positive for light-headedness.   All other systems reviewed and are negative.      OBJECTIVE:     Vitals:    08/27/19 1426   Pulse: 79   SpO2: 98%   Weight: (!) 205 kg (452 lb)   Height: 177.8 cm (70\")     Body mass index is 64.86 kg/m².    Physical Exam   Constitutional: He is oriented to person, place, and time. Vital signs are normal. He appears well-developed and well-nourished.   Morbid obesity     Eyes: Conjunctivae are normal.   Neck: Carotid bruit is not present.   Cardiovascular: Normal rate, regular rhythm and normal heart sounds.   No murmur heard.  Pulmonary/Chest: Effort normal and breath sounds normal.   Abdominal: Normal appearance.   Musculoskeletal: Normal range of motion.   No pedal edema   Neurological: He is alert and oriented to person, place, and time. GCS eye subscore is 4. GCS verbal subscore is 5. GCS motor subscore is 6.   Skin: Skin is warm and dry.   Psychiatric: He has a normal mood and affect. His speech is normal and behavior is normal. Judgment and thought content normal. Cognition and memory are normal.         ECG 12 Lead  Date/Time: 8/27/2019 2:53 PM  Performed by: Awilda Krishnan APRN  Authorized by: Awilda Krishnan APRN   Comparison: compared with previous ECG   Similar to previous ECG  Rhythm: sinus rhythm  Rate: normal  BPM: 79  Conduction: conduction normal  ST Segments: ST segments normal  T Waves: T waves normal  QRS axis: normal    Clinical impression: normal ECG  Comments: Baseline artifact                  ASSESSMENT:      Diagnosis Plan   1. Precordial chest pain  Adult Transthoracic Echo Complete W/ Cont if Necessary Per Protocol "    Holter Monitor - 24 Hour    Stress Test With Myocardial Perfusion Two Day   2. Syncope, unspecified syncope type  Adult Transthoracic Echo Complete W/ Cont if Necessary Per Protocol    Holter Monitor - 24 Hour    Stress Test With Myocardial Perfusion Two Day   3. Hypertension not at goal     4. Morbid obesity with body mass index of 60.0-69.9 in adult (CMS/[facility])           PLAN OF CARE:     Patient with history of uncontrolled hypertension.  He was started on hydralazine 3 times daily for uncontrolled hypertension.  At that time he began having episodes of fatigue.  Patient went on vacation to [place] with his family when he walked into an air conditioned building and started having episodes of chest tightness that was in the midsternal that radiated to the back.  Patient did not continue on the right and stepped outside to wait for his family.  Chest pain began to intensify and a bystander called EMS.  Prior to EMS arrival patient had syncopal episode.  When EMS arrived patient did come to and chest pain had resolved.  He did have vital signs taken which revealed blood pressure with systolic in the 100s-110s.  ECG was performed but unfortunately had baseline artifact and was difficult to interpret.  Patient then had 2 similar episodes, but with near syncope instead of syncope that did not last as long and did not have such intensified pain.  Both of those episodes occurred while patient was resting.  I am concerned that hydralazine could be contributing.  I recommended that patient stop hydralazine.  He is also never had a ischemic evaluation and is at risk given his obesity, hyperlipidemia and hypertension.  We will proceed with a 2-day myocardial perfusion stress test, echocardiogram, 24-hour Holter to evaluate for dysrhythmias that could cause syncope or near syncope.  Case was discussed with Dr. Ritter's who agrees with the plan of care.           Discharge Medications           Accurate as of 8/27/19   3:29 PM. If you have any questions, ask your nurse or doctor.               Continue These Medications      Instructions Start Date   amLODIPine 5 MG tablet  Commonly known as:  NORVASC   5 mg, Oral, Daily      aspirin 81 MG chewable tablet   81 mg, Oral, Daily      carvedilol 25 MG tablet  Commonly known as:  COREG   25 mg, Oral, 2 Times Daily With Meals      hydrALAZINE 10 MG tablet  Commonly known as:  APRESOLINE   10 mg, Oral, 3 Times Daily      hydrochlorothiazide 25 MG tablet  Commonly known as:  HYDRODIURIL   25 mg, Oral, Daily      topiramate 25 MG tablet  Commonly known as:  TOPAMAX   25 mg, Oral, 2 Times Daily      valsartan 320 MG tablet  Commonly known as:  DIOVAN   320 mg, Oral, Daily             Thank you for allowing me to participate in the care of your patient,         Awilda Krishnan, THAI  [facility]  08/27/19  3:29 PM    **Raji Disclaimer:**  Much of this encounter note is an electronic transcription/translation of spoken language to printed text. The electronic translation of spoken language may permit erroneous, or at times, nonsensical words or phrases to be inadvertently transcribed. Although I have reviewed the note for such errors, some may still exist.

## 2019-09-10 ENCOUNTER — HOSPITAL ENCOUNTER (OUTPATIENT)
Dept: CARDIOLOGY | Facility: HOSPITAL | Age: 49
Discharge: HOME | End: 2019-09-10

## 2019-09-10 ENCOUNTER — HOSPITAL ENCOUNTER (OUTPATIENT)
Dept: CARDIOLOGY | Facility: HOSPITAL | Age: 49
Discharge: HOME | End: 2019-09-10
Admitting: NURSE PRACTITIONER

## 2019-09-10 VITALS — WEIGHT: 315 LBS | BODY MASS INDEX: 44.1 KG/M2 | HEIGHT: 71 IN

## 2019-09-10 VITALS
DIASTOLIC BLOOD PRESSURE: 67 MMHG | BODY MASS INDEX: 45.1 KG/M2 | OXYGEN SATURATION: 94 % | HEIGHT: 70 IN | SYSTOLIC BLOOD PRESSURE: 165 MMHG | HEART RATE: 74 BPM | WEIGHT: 315 LBS

## 2019-09-10 DIAGNOSIS — R55 SYNCOPE, UNSPECIFIED SYNCOPE TYPE: ICD-10-CM

## 2019-09-10 DIAGNOSIS — R07.2 PRECORDIAL CHEST PAIN: ICD-10-CM

## 2019-09-10 LAB
BH CV NUCLEAR PRIOR STUDY: 2
BH CV STRESS BP STAGE 1: NORMAL
BH CV STRESS COMMENTS STAGE 1: NORMAL
BH CV STRESS DOSE REGADENOSON STAGE 1: 0.4
BH CV STRESS DURATION MIN STAGE 1: 0
BH CV STRESS DURATION SEC STAGE 1: 10
BH CV STRESS HR STAGE 1: 92
BH CV STRESS PROTOCOL 1: NORMAL
BH CV STRESS RECOVERY BP: NORMAL MMHG
BH CV STRESS RECOVERY HR: 86 BPM
BH CV STRESS STAGE 1: 1
LV EF NUC BP: 63 %
MAXIMAL PREDICTED HEART RATE: 171 BPM
PERCENT MAX PREDICTED HR: 53.8 %
STRESS BASELINE BP: NORMAL MMHG
STRESS BASELINE HR: 74 BPM
STRESS PERCENT HR: 63 %
STRESS POST EXERCISE DUR SEC: 10 SEC
STRESS POST PEAK BP: NORMAL MMHG
STRESS POST PEAK HR: 92 BPM
STRESS TARGET HR: 145 BPM

## 2019-09-10 PROCEDURE — 93018 CV STRESS TEST I&R ONLY: CPT | Performed by: INTERNAL MEDICINE

## 2019-09-10 PROCEDURE — 0 TECHNETIUM TETROFOSMIN KIT: Performed by: NURSE PRACTITIONER

## 2019-09-10 PROCEDURE — 78451 HT MUSCLE IMAGE SPECT SING: CPT

## 2019-09-10 PROCEDURE — 93306 TTE W/DOPPLER COMPLETE: CPT

## 2019-09-10 PROCEDURE — 93306 TTE W/DOPPLER COMPLETE: CPT | Performed by: INTERNAL MEDICINE

## 2019-09-10 PROCEDURE — A9502 TC99M TETROFOSMIN: HCPCS | Performed by: NURSE PRACTITIONER

## 2019-09-10 PROCEDURE — 25010000002 AMINOPHYLLINE PER 250 MG: Performed by: NURSE PRACTITIONER

## 2019-09-10 PROCEDURE — 25010000002 PERFLUTREN (DEFINITY) 8.476 MG IN SODIUM CHLORIDE 0.9 % 10 ML INJECTION: Performed by: NURSE PRACTITIONER

## 2019-09-10 PROCEDURE — 93016 CV STRESS TEST SUPVJ ONLY: CPT | Performed by: INTERNAL MEDICINE

## 2019-09-10 PROCEDURE — 78451 HT MUSCLE IMAGE SPECT SING: CPT | Performed by: INTERNAL MEDICINE

## 2019-09-10 PROCEDURE — 25010000002 REGADENOSON 0.4 MG/5ML SOLUTION: Performed by: NURSE PRACTITIONER

## 2019-09-10 PROCEDURE — 93017 CV STRESS TEST TRACING ONLY: CPT

## 2019-09-10 RX ORDER — AMINOPHYLLINE DIHYDRATE 25 MG/ML
125 INJECTION, SOLUTION INTRAVENOUS ONCE
Status: COMPLETED | OUTPATIENT
Start: 2019-09-10 | End: 2019-09-10

## 2019-09-10 RX ADMIN — REGADENOSON 0.4 MG: 0.08 INJECTION, SOLUTION INTRAVENOUS at 07:57

## 2019-09-10 RX ADMIN — PERFLUTREN 2 ML: 6.52 INJECTION, SUSPENSION INTRAVENOUS at 07:42

## 2019-09-10 RX ADMIN — TETROFOSMIN 1 DOSE: 1.38 INJECTION, POWDER, LYOPHILIZED, FOR SOLUTION INTRAVENOUS at 07:57

## 2019-09-10 RX ADMIN — AMINOPHYLLINE 125 MG: 25 INJECTION, SOLUTION INTRAVENOUS at 08:00

## 2019-09-11 ENCOUNTER — TELEPHONE (OUTPATIENT)
Dept: CARDIOLOGY | Facility: CLINIC | Age: 49
End: 2019-09-11

## 2019-09-11 LAB
AORTIC ARCH: 2.86 CM
AORTIC ROOT ANNULUS: 2 CM
ASCENDING AORTA: 3.2 CM
BH CV ECHO MEAS - ACS: 1.9 CM
BH CV ECHO MEAS - AO MAX PG (FULL): 4.2 MMHG
BH CV ECHO MEAS - AO MAX PG: 9.7 MMHG
BH CV ECHO MEAS - AO MEAN PG (FULL): 3.2 MMHG
BH CV ECHO MEAS - AO MEAN PG: 6.1 MMHG
BH CV ECHO MEAS - AO ROOT AREA (BSA CORRECTED): 1.2
BH CV ECHO MEAS - AO ROOT AREA: 9.4 CM^2
BH CV ECHO MEAS - AO ROOT DIAM: 3.5 CM
BH CV ECHO MEAS - AO V2 MAX: 156.1 CM/SEC
BH CV ECHO MEAS - AO V2 MEAN: 117.4 CM/SEC
BH CV ECHO MEAS - AO V2 VTI: 27.3 CM
BH CV ECHO MEAS - ASC AORTA: 3.2 CM
BH CV ECHO MEAS - AVA(I,A): 2.7 CM^2
BH CV ECHO MEAS - AVA(I,D): 2.7 CM^2
BH CV ECHO MEAS - AVA(V,A): 2.5 CM^2
BH CV ECHO MEAS - AVA(V,D): 2.5 CM^2
BH CV ECHO MEAS - BSA(HAYCOCK): 3.3 M^2
BH CV ECHO MEAS - BSA: 3 M^2
BH CV ECHO MEAS - BZI_BMI: 64.9 KILOGRAMS/M^2
BH CV ECHO MEAS - BZI_METRIC_HEIGHT: 177.8 CM
BH CV ECHO MEAS - BZI_METRIC_WEIGHT: 205 KG
BH CV ECHO MEAS - EDV(MOD-SP2): 87 ML
BH CV ECHO MEAS - EDV(MOD-SP4): 72 ML
BH CV ECHO MEAS - EDV(TEICH): 201.9 ML
BH CV ECHO MEAS - EF(CUBED): 66.7 %
BH CV ECHO MEAS - EF(MOD-BP): 60 %
BH CV ECHO MEAS - EF(MOD-SP2): 58.6 %
BH CV ECHO MEAS - EF(MOD-SP4): 63.9 %
BH CV ECHO MEAS - EF(TEICH): 57.2 %
BH CV ECHO MEAS - ESV(MOD-SP2): 36 ML
BH CV ECHO MEAS - ESV(MOD-SP4): 26 ML
BH CV ECHO MEAS - ESV(TEICH): 86.4 ML
BH CV ECHO MEAS - FS: 30.7 %
BH CV ECHO MEAS - IVS/LVPW: 1
BH CV ECHO MEAS - IVSD: 1.4 CM
BH CV ECHO MEAS - LAT PEAK E' VEL: 7 CM/SEC
BH CV ECHO MEAS - LV DIASTOLIC VOL/BSA (35-75): 24.4 ML/M^2
BH CV ECHO MEAS - LV MASS(C)D: 420 GRAMS
BH CV ECHO MEAS - LV MASS(C)DI: 142.3 GRAMS/M^2
BH CV ECHO MEAS - LV MAX PG: 5.5 MMHG
BH CV ECHO MEAS - LV MEAN PG: 3 MMHG
BH CV ECHO MEAS - LV SYSTOLIC VOL/BSA (12-30): 8.8 ML/M^2
BH CV ECHO MEAS - LV V1 MAX: 117.4 CM/SEC
BH CV ECHO MEAS - LV V1 MEAN: 79.5 CM/SEC
BH CV ECHO MEAS - LV V1 VTI: 22.1 CM
BH CV ECHO MEAS - LVIDD: 6.3 CM
BH CV ECHO MEAS - LVIDS: 4.4 CM
BH CV ECHO MEAS - LVLD AP2: 8 CM
BH CV ECHO MEAS - LVLD AP4: 7.7 CM
BH CV ECHO MEAS - LVLS AP2: 7.1 CM
BH CV ECHO MEAS - LVLS AP4: 6.5 CM
BH CV ECHO MEAS - LVOT AREA (M): 3.1 CM^2
BH CV ECHO MEAS - LVOT AREA: 3.3 CM^2
BH CV ECHO MEAS - LVOT DIAM: 2 CM
BH CV ECHO MEAS - LVPWD: 1.4 CM
BH CV ECHO MEAS - MED PEAK E' VEL: 6 CM/SEC
BH CV ECHO MEAS - MV A DUR: 0.1 SEC
BH CV ECHO MEAS - MV A MAX VEL: 78.6 CM/SEC
BH CV ECHO MEAS - MV DEC SLOPE: 677 CM/SEC^2
BH CV ECHO MEAS - MV DEC TIME: 0.14 SEC
BH CV ECHO MEAS - MV E MAX VEL: 99.4 CM/SEC
BH CV ECHO MEAS - MV E/A: 1.3
BH CV ECHO MEAS - MV MAX PG: 4.1 MMHG
BH CV ECHO MEAS - MV MEAN PG: 1.6 MMHG
BH CV ECHO MEAS - MV P1/2T MAX VEL: 103.2 CM/SEC
BH CV ECHO MEAS - MV P1/2T: 44.6 MSEC
BH CV ECHO MEAS - MV V2 MAX: 101.8 CM/SEC
BH CV ECHO MEAS - MV V2 MEAN: 60 CM/SEC
BH CV ECHO MEAS - MV V2 VTI: 31.9 CM
BH CV ECHO MEAS - MVA P1/2T LCG: 2.1 CM^2
BH CV ECHO MEAS - MVA(P1/2T): 4.9 CM^2
BH CV ECHO MEAS - MVA(VTI): 2.3 CM^2
BH CV ECHO MEAS - PA ACC TIME: 0.06 SEC
BH CV ECHO MEAS - PA MAX PG (FULL): 3.3 MMHG
BH CV ECHO MEAS - PA MAX PG: 5.1 MMHG
BH CV ECHO MEAS - PA PR(ACCEL): 53.6 MMHG
BH CV ECHO MEAS - PA V2 MAX: 112.7 CM/SEC
BH CV ECHO MEAS - PULM A REVS DUR: 0.08 SEC
BH CV ECHO MEAS - PULM A REVS VEL: 27.7 CM/SEC
BH CV ECHO MEAS - PULM DIAS VEL: 43.7 CM/SEC
BH CV ECHO MEAS - PULM S/D: 1.1
BH CV ECHO MEAS - PULM SYS VEL: 47.3 CM/SEC
BH CV ECHO MEAS - PVA(V,A): 1.9 CM^2
BH CV ECHO MEAS - PVA(V,D): 1.9 CM^2
BH CV ECHO MEAS - QP/QS: 0.66
BH CV ECHO MEAS - RAP SYSTOLE: 3 MMHG
BH CV ECHO MEAS - RV MAX PG: 1.8 MMHG
BH CV ECHO MEAS - RV MEAN PG: 1.1 MMHG
BH CV ECHO MEAS - RV V1 MAX: 67.4 CM/SEC
BH CV ECHO MEAS - RV V1 MEAN: 48.6 CM/SEC
BH CV ECHO MEAS - RV V1 VTI: 14.7 CM
BH CV ECHO MEAS - RVOT AREA: 3.2 CM^2
BH CV ECHO MEAS - RVOT DIAM: 2 CM
BH CV ECHO MEAS - RVSP: 20 MMHG
BH CV ECHO MEAS - SI(AO): 86.8 ML/M^2
BH CV ECHO MEAS - SI(CUBED): 56.8 ML/M^2
BH CV ECHO MEAS - SI(LVOT): 24.6 ML/M^2
BH CV ECHO MEAS - SI(MOD-SP2): 17.3 ML/M^2
BH CV ECHO MEAS - SI(MOD-SP4): 15.6 ML/M^2
BH CV ECHO MEAS - SI(TEICH): 39.1 ML/M^2
BH CV ECHO MEAS - SUP REN AO DIAM: 2.3 CM
BH CV ECHO MEAS - SV(AO): 256.3 ML
BH CV ECHO MEAS - SV(CUBED): 167.6 ML
BH CV ECHO MEAS - SV(LVOT): 72.5 ML
BH CV ECHO MEAS - SV(MOD-SP2): 51 ML
BH CV ECHO MEAS - SV(MOD-SP4): 46 ML
BH CV ECHO MEAS - SV(RVOT): 47.5 ML
BH CV ECHO MEAS - SV(TEICH): 115.5 ML
BH CV ECHO MEAS - TAPSE (>1.6): 2.8 CM2
BH CV ECHO MEAS - TR MAX VEL: 203.6 CM/SEC
BH CV ECHO MEASUREMENTS AVERAGE E/E' RATIO: 15.29
BH CV VAS BP RIGHT ARM: NORMAL MMHG
BH CV XLRA - RV BASE: 3.6 CM
BH CV XLRA - TDI S': 14 CM/SEC
LEFT ATRIUM VOLUME INDEX: 26 ML/M2
LV EF 2D ECHO EST: 60 %
MAXIMAL PREDICTED HEART RATE: 171 BPM
SINUS: 3.3 CM
STJ: 3.2 CM
STRESS TARGET HR: 145 BPM

## 2019-09-11 NOTE — TELEPHONE ENCOUNTER
Sangeeta,    Mr. Schafer said that Dr. Ritter stopped his hydralazine at his last appt with you. He took his B/P at work a week ago (after being off the hydralazine) and his B/P was 186/112.     Does he need to be started on a different medication?    Lenora Hathaway, RN  Triage RN [facility]

## 2019-09-11 NOTE — PROGRESS NOTES
Can you please make patient aware of normal echocardiogram.  Pumping function of the heart is normal.  He does have some thickening of the aortic valve but it is not causing any flow problems through the valve.  He also has some mild calcium on the mitral valve but again and not causing any flow problems through the valve.    Thanks,  NOE

## 2019-09-11 NOTE — TELEPHONE ENCOUNTER
Called and left VM. Will go over results when pt calls us back.    Lenora Hathaway, RN  Triage RN [facility]

## 2019-09-11 NOTE — TELEPHONE ENCOUNTER
----- Message from THAI Christie sent at 9/11/2019 12:32 PM EDT -----  Can you please make patient aware of normal echocardiogram.  Pumping function of the heart is normal.  He does have some thickening of the aortic valve but it is not causing any flow problems through the valve.  He also has some mild calcium on the mitral valve but again and not causing any flow problems through the valve.    Thanks,  NOE

## 2019-09-12 DIAGNOSIS — I10 ESSENTIAL HYPERTENSION: ICD-10-CM

## 2019-09-12 RX ORDER — HYDRALAZINE HYDROCHLORIDE 10 MG/1
5 TABLET, FILM COATED ORAL 2 TIMES DAILY
Qty: 120 TABLET | Refills: 5
Start: 2019-09-12 | End: 2019-12-16

## 2019-09-12 NOTE — TELEPHONE ENCOUNTER
Spoke with Sangeeta. Pt to take Hydralazine 5 mg bid. Sangeeta placed orders. Notified pt. He verbalized understanding.    Lenora Hathaway, RN  Triage RN [facility] [place], [place]

## 2019-11-18 ENCOUNTER — OFFICE VISIT (OUTPATIENT)
Dept: RETAIL CLINIC | Facility: CLINIC | Age: 49
End: 2019-11-18

## 2019-11-18 VITALS
DIASTOLIC BLOOD PRESSURE: 96 MMHG | HEART RATE: 78 BPM | SYSTOLIC BLOOD PRESSURE: 144 MMHG | TEMPERATURE: 101.1 F | OXYGEN SATURATION: 97 %

## 2019-11-18 DIAGNOSIS — H66.003 NON-RECURRENT ACUTE SUPPURATIVE OTITIS MEDIA OF BOTH EARS WITHOUT SPONTANEOUS RUPTURE OF TYMPANIC MEMBRANES: Primary | ICD-10-CM

## 2019-11-18 DIAGNOSIS — J01.10 ACUTE NON-RECURRENT FRONTAL SINUSITIS: ICD-10-CM

## 2019-11-18 PROCEDURE — 99213 OFFICE O/P EST LOW 20 MIN: CPT | Performed by: NURSE PRACTITIONER

## 2019-11-18 RX ORDER — AMOXICILLIN AND CLAVULANATE POTASSIUM 875; 125 MG/1; MG/1
1 TABLET, FILM COATED ORAL 2 TIMES DAILY
Qty: 20 TABLET | Refills: 0 | Status: SHIPPED | OUTPATIENT
Start: 2019-11-18 | End: 2019-11-28

## 2019-11-18 NOTE — PROGRESS NOTES
"Subjective   Alex Schafer is a 48 y.o. male.     URI    This is a new problem. The current episode started 1 to 4 weeks ago (2 weeks ago). Associated symptoms include abdominal pain (\"cramping\"), congestion, coughing, ear pain, headaches, nausea, a plugged ear sensation, rhinorrhea, sinus pain, sneezing and a sore throat. Pertinent negatives include no diarrhea or vomiting. Associated symptoms comments: Started with N/V at work which lasted 2-3 days, then sinus issues began. Dizziness, \"bumping into things\"  . Treatments tried: robitussin, Nyquil, Dayquil, tylenol. The treatment provided mild relief.    pt. Reports symptoms have gotten worse over the last two days. \"did not get out of bed yesterday and only had a powerade, water, and hot chocolate in the past two days.\" Had fever of 101.6 this AM and took tylenol.     The following portions of the patient's history were reviewed and updated as appropriate: allergies, current medications, past family history, past medical history, past social history, past surgical history and problem list.    Review of Systems   Constitutional: Positive for appetite change, chills, fatigue and fever (101.6).   HENT: Positive for congestion, ear pain, postnasal drip, rhinorrhea, sinus pressure, sneezing and sore throat.    Respiratory: Positive for cough.    Cardiovascular: Negative.    Gastrointestinal: Positive for abdominal pain (\"cramping\") and nausea. Negative for diarrhea and vomiting.   Musculoskeletal: Positive for myalgias.   Neurological: Positive for dizziness and headache (right side/frontal).       Objective   Physical Exam   Constitutional: He is oriented to person, place, and time. He appears well-developed and well-nourished. No distress.   HENT:   Head: Normocephalic and atraumatic.   Right Ear: Hearing, external ear and ear canal normal. No drainage. Tympanic membrane is erythematous and bulging. A middle ear effusion is present.   Left Ear: Hearing, external ear and " ear canal normal. There is drainage. Tympanic membrane is erythematous and bulging. A middle ear effusion is present.   Nose: Rhinorrhea and congestion present. Right sinus exhibits frontal sinus tenderness. Right sinus exhibits no maxillary sinus tenderness. Left sinus exhibits frontal sinus tenderness. Left sinus exhibits no maxillary sinus tenderness.   Mouth/Throat: Mucous membranes are normal. Uvula swelling present. Oropharyngeal exudate, posterior oropharyngeal edema (moderate clear postnasal drip) and posterior oropharyngeal erythema present. Tonsils are 1+ on the right. Tonsils are 1+ on the left. No tonsillar exudate.   Eyes: Conjunctivae and EOM are normal. Pupils are equal, round, and reactive to light.   Neck: Normal range of motion.   Cardiovascular: Normal rate, regular rhythm and normal heart sounds. Exam reveals no gallop and no friction rub.   No murmur heard.  Pulmonary/Chest: Effort normal and breath sounds normal. No respiratory distress.   Lymphadenopathy:        Head (right side): Tonsillar adenopathy present. No submental, no submandibular, no preauricular and no posterior auricular adenopathy present.        Head (left side): Tonsillar adenopathy present. No submental, no submandibular, no preauricular and no posterior auricular adenopathy present.     He has no cervical adenopathy.   Neurological: He is alert and oriented to person, place, and time.   Skin: Skin is warm and dry. He is not diaphoretic.   Psychiatric: He has a normal mood and affect.         Assessment/Plan   Diagnoses and all orders for this visit:    Non-recurrent acute suppurative otitis media of both ears without spontaneous rupture of tympanic membranes    Acute non-recurrent frontal sinusitis    Other orders  -     amoxicillin-clavulanate (AUGMENTIN) 875-125 MG per tablet; Take 1 tablet by mouth 2 (Two) Times a Day for 10 days.        Instructed pt to alternate tylenol and ibuprofen for 24 hours, then as needed for  fever. Instructed pt. To follow-up with PCP if no improvement in symptoms.

## 2019-12-16 ENCOUNTER — OFFICE VISIT (OUTPATIENT)
Dept: FAMILY MEDICINE CLINIC | Facility: CLINIC | Age: 49
End: 2019-12-16

## 2019-12-16 VITALS
TEMPERATURE: 98.8 F | DIASTOLIC BLOOD PRESSURE: 92 MMHG | SYSTOLIC BLOOD PRESSURE: 136 MMHG | BODY MASS INDEX: 44.1 KG/M2 | HEART RATE: 94 BPM | RESPIRATION RATE: 18 BRPM | WEIGHT: 315 LBS | HEIGHT: 71 IN

## 2019-12-16 DIAGNOSIS — R51.9 CHRONIC NONINTRACTABLE HEADACHE, UNSPECIFIED HEADACHE TYPE: ICD-10-CM

## 2019-12-16 DIAGNOSIS — G89.29 CHRONIC NONINTRACTABLE HEADACHE, UNSPECIFIED HEADACHE TYPE: ICD-10-CM

## 2019-12-16 DIAGNOSIS — I10 ESSENTIAL HYPERTENSION: ICD-10-CM

## 2019-12-16 PROCEDURE — 99213 OFFICE O/P EST LOW 20 MIN: CPT | Performed by: FAMILY MEDICINE

## 2019-12-16 RX ORDER — AMLODIPINE BESYLATE 5 MG/1
5 TABLET ORAL DAILY
Qty: 30 TABLET | Refills: 5 | Status: SHIPPED | OUTPATIENT
Start: 2019-12-16 | End: 2020-06-15 | Stop reason: SDUPTHER

## 2019-12-16 RX ORDER — TOPIRAMATE 25 MG/1
25 TABLET ORAL 2 TIMES DAILY
Qty: 60 TABLET | Refills: 5 | Status: SHIPPED | OUTPATIENT
Start: 2019-12-16 | End: 2020-06-15 | Stop reason: SDUPTHER

## 2019-12-16 RX ORDER — HYDROCHLOROTHIAZIDE 25 MG/1
25 TABLET ORAL DAILY
Qty: 30 TABLET | Refills: 5 | Status: SHIPPED | OUTPATIENT
Start: 2019-12-16 | End: 2020-06-15 | Stop reason: SDUPTHER

## 2019-12-16 RX ORDER — VALSARTAN 320 MG/1
320 TABLET ORAL DAILY
Qty: 30 TABLET | Refills: 5 | Status: SHIPPED | OUTPATIENT
Start: 2019-12-16 | End: 2020-06-15 | Stop reason: SDUPTHER

## 2019-12-16 RX ORDER — CARVEDILOL 25 MG/1
25 TABLET ORAL 2 TIMES DAILY WITH MEALS
Qty: 60 TABLET | Refills: 5 | Status: SHIPPED | OUTPATIENT
Start: 2019-12-16 | End: 2020-06-15 | Stop reason: SDUPTHER

## 2019-12-16 NOTE — PROGRESS NOTES
"Subjective   Alex Schafer is a 49 y.o. male.     History of Present Illness     Chief Complaint:   Chief Complaint   Patient presents with   • Hypertension     med refill  - no labs  - pedro bp done today    • Hyperlipidemia     no bp meds this am per pt    • Headache   • Sinusitis     11/18/2019 follow up urgent care        Alex Schafer 49 y.o. male who presents today for Medical Management of the below listed issues and medication refills.  he has a problem list of   Patient Active Problem List   Diagnosis   • Hyperlipidemia   • Hypertension   • GERD (gastroesophageal reflux disease)   • Morbid obesity with body mass index of 60.0-69.9 in adult (CMS/[facility])   • GERBER on CPAP   • IFG (impaired fasting glucose)   .  Since the last visit, he has overall felt well until being seen at [facility] on 11/18 and being treated with Augmentin for sinusitis and is doing much better now.    he has been compliant with   Current Outpatient Medications:   •  amLODIPine (NORVASC) 5 MG tablet, Take 1 tablet by mouth Daily., Disp: 30 tablet, Rfl: 5  •  carvedilol (COREG) 25 MG tablet, Take 1 tablet by mouth 2 (Two) Times a Day With Meals., Disp: 60 tablet, Rfl: 5  •  hydroCHLOROthiazide (HYDRODIURIL) 25 MG tablet, Take 1 tablet by mouth Daily., Disp: 30 tablet, Rfl: 5  •  topiramate (TOPAMAX) 25 MG tablet, Take 1 tablet by mouth 2 (Two) Times a Day., Disp: 60 tablet, Rfl: 5  •  valsartan (DIOVAN) 320 MG tablet, Take 1 tablet by mouth Daily., Disp: 30 tablet, Rfl: 5  •  aspirin 81 MG chewable tablet, Chew 81 mg Daily., Disp: , Rfl: .  he denies medication side effects.    Dr Frost stopped his Hydralazine due to lightheadedness issues.    All of the other chronic condition(s) listed above are stable w/o issues.    /92   Pulse 94   Temp 98.8 °F (37.1 °C) (Oral)   Resp 18   Ht 180.3 cm (71\")   Wt (!) 205 kg (453 lb)   BMI 63.18 kg/m²     Results for orders placed or performed during the hospital encounter of 09/10/19   Stress Test " With Myocardial Perfusion One Day   Result Value Ref Range    Nuclear Prior Study 2     BH CV STRESS PROTOCOL 1 Pharmacologic     Stage 1 1     HR Stage 1 92     BP Stage 1 161/98     Duration Min Stage 1 0     Duration Sec Stage 1 10     Stress Dose Regadenoson Stage 1 0.4     Stress Comments Stage 1 10 sec bolus injection     Baseline HR 74 bpm    Baseline /105 mmHg    Peak HR 92 bpm    Percent Max Pred HR 53.80 %    Percent Target HR 63 %    Peak /98 mmHg    Recovery HR 86 bpm    Recovery /109 mmHg    Target HR (85%) 145 bpm    Max. Pred. HR (100%) 171 bpm    Exercise duration (sec) 10 sec    Nuc Stress EF 63 %           The following portions of the patient's history were reviewed and updated as appropriate: allergies, current medications, past family history, past medical history, past social history, past surgical history and problem list.    Review of Systems   Constitutional: Negative for activity change, chills, fatigue and fever.   Respiratory: Negative for cough and shortness of breath.    Cardiovascular: Negative for chest pain and palpitations.   Gastrointestinal: Negative for abdominal pain.   Endocrine: Negative for cold intolerance.   Psychiatric/Behavioral: Negative for behavioral problems and dysphoric mood. The patient is not nervous/anxious.        Objective   Physical Exam   Constitutional: He appears well-developed and well-nourished.   Neck: Neck supple. No thyromegaly present.   Cardiovascular: Normal rate and regular rhythm.   No murmur heard.  Pulmonary/Chest: Effort normal and breath sounds normal.   Abdominal: Bowel sounds are normal. There is no tenderness.   Psychiatric: He has a normal mood and affect. His behavior is normal.   Nursing note and vitals reviewed.      Assessment/Plan   Alex was seen today for hypertension, hyperlipidemia, headache and sinusitis.    Diagnoses and all orders for this visit:    Essential hypertension  -     carvedilol (COREG) 25 MG  tablet; Take 1 tablet by mouth 2 (Two) Times a Day With Meals.  -     valsartan (DIOVAN) 320 MG tablet; Take 1 tablet by mouth Daily.  -     amLODIPine (NORVASC) 5 MG tablet; Take 1 tablet by mouth Daily.  -     hydroCHLOROthiazide (HYDRODIURIL) 25 MG tablet; Take 1 tablet by mouth Daily.    Chronic nonintractable headache, unspecified headache type  -     topiramate (TOPAMAX) 25 MG tablet; Take 1 tablet by mouth 2 (Two) Times a Day.

## 2020-06-15 ENCOUNTER — OFFICE VISIT (OUTPATIENT)
Dept: FAMILY MEDICINE CLINIC | Facility: CLINIC | Age: 50
End: 2020-06-15

## 2020-06-15 VITALS
TEMPERATURE: 97.8 F | DIASTOLIC BLOOD PRESSURE: 103 MMHG | RESPIRATION RATE: 20 BRPM | SYSTOLIC BLOOD PRESSURE: 199 MMHG | BODY MASS INDEX: 44.1 KG/M2 | HEIGHT: 71 IN | WEIGHT: 315 LBS | HEART RATE: 100 BPM

## 2020-06-15 DIAGNOSIS — I10 ESSENTIAL HYPERTENSION: Primary | ICD-10-CM

## 2020-06-15 DIAGNOSIS — G89.29 CHRONIC NONINTRACTABLE HEADACHE, UNSPECIFIED HEADACHE TYPE: ICD-10-CM

## 2020-06-15 DIAGNOSIS — R51.9 CHRONIC NONINTRACTABLE HEADACHE, UNSPECIFIED HEADACHE TYPE: ICD-10-CM

## 2020-06-15 DIAGNOSIS — Z12.5 SCREENING FOR PROSTATE CANCER: ICD-10-CM

## 2020-06-15 PROCEDURE — 99213 OFFICE O/P EST LOW 20 MIN: CPT | Performed by: FAMILY MEDICINE

## 2020-06-15 RX ORDER — AMLODIPINE BESYLATE 5 MG/1
5 TABLET ORAL DAILY
Qty: 30 TABLET | Refills: 5 | Status: SHIPPED | OUTPATIENT
Start: 2020-06-15 | End: 2020-12-13 | Stop reason: SDUPTHER

## 2020-06-15 RX ORDER — HYDROCHLOROTHIAZIDE 25 MG/1
25 TABLET ORAL DAILY
Qty: 30 TABLET | Refills: 5 | Status: SHIPPED | OUTPATIENT
Start: 2020-06-15 | End: 2020-12-13 | Stop reason: SDUPTHER

## 2020-06-15 RX ORDER — TOPIRAMATE 25 MG/1
25 TABLET ORAL 2 TIMES DAILY
Qty: 60 TABLET | Refills: 5 | Status: SHIPPED | OUTPATIENT
Start: 2020-06-15 | End: 2020-12-13 | Stop reason: SDUPTHER

## 2020-06-15 RX ORDER — VALSARTAN 320 MG/1
320 TABLET ORAL DAILY
Qty: 30 TABLET | Refills: 5 | Status: SHIPPED | OUTPATIENT
Start: 2020-06-15 | End: 2020-12-13 | Stop reason: SDUPTHER

## 2020-06-15 RX ORDER — CARVEDILOL 25 MG/1
25 TABLET ORAL 2 TIMES DAILY WITH MEALS
Qty: 60 TABLET | Refills: 5 | Status: SHIPPED | OUTPATIENT
Start: 2020-06-15 | End: 2020-12-13 | Stop reason: SDUPTHER

## 2020-06-15 NOTE — PROGRESS NOTES
"Subjective   Alex Schafer is a 49 y.o. male.     History of Present Illness     Chief Complaint:   Chief Complaint   Patient presents with   • Hypertension     off bp meds - x  1-2 months per pt = no labs    • Hyperlipidemia       Alex Schafer 49 y.o. male who presents today for Medical Management of the below listed issues and medication refills.  he has a problem list of   Patient Active Problem List   Diagnosis   • Hyperlipidemia   • Hypertension   • GERD (gastroesophageal reflux disease)   • Morbid obesity with body mass index of 60.0-69.9 in adult (CMS/[facility])   • GERBER on CPAP   • IFG (impaired fasting glucose)   .  Since the last visit, he has been off his medications for several months.  he has been compliant with these medications until then.   Current Outpatient Medications:   •  amLODIPine (NORVASC) 5 MG tablet, Take 1 tablet by mouth Daily., Disp: 30 tablet, Rfl: 5  •  aspirin 81 MG chewable tablet, Chew 81 mg Daily., Disp: , Rfl:   •  carvedilol (COREG) 25 MG tablet, Take 1 tablet by mouth 2 (Two) Times a Day With Meals., Disp: 60 tablet, Rfl: 5  •  hydroCHLOROthiazide (HYDRODIURIL) 25 MG tablet, Take 1 tablet by mouth Daily., Disp: 30 tablet, Rfl: 5  •  topiramate (TOPAMAX) 25 MG tablet, Take 1 tablet by mouth 2 (Two) Times a Day., Disp: 60 tablet, Rfl: 5  •  valsartan (DIOVAN) 320 MG tablet, Take 1 tablet by mouth Daily., Disp: 30 tablet, Rfl: 5.  he denies medication side effects.    All of the other chronic condition(s) listed above are stable w/o issues.    BP (!) 199/103   Pulse 100   Temp 97.8 °F (36.6 °C) (Oral)   Resp 20   Ht 180.3 cm (71\")   Wt (!) 210 kg (463 lb 6.4 oz)   BMI 64.63 kg/m²     Results for orders placed or performed during the hospital encounter of 09/10/19   Stress Test With Myocardial Perfusion One Day   Result Value Ref Range    Nuclear Prior Study 2     BH CV STRESS PROTOCOL 1 Pharmacologic     Stage 1 1     HR Stage 1 92     BP Stage 1 161/98     Duration Min Stage 1 0     " Duration Sec Stage 1 10     Stress Dose Regadenoson Stage 1 0.4     Stress Comments Stage 1 10 sec bolus injection     Baseline HR 74 bpm    Baseline /105 mmHg    Peak HR 92 bpm    Percent Max Pred HR 53.80 %    Percent Target HR 63 %    Peak /98 mmHg    Recovery HR 86 bpm    Recovery /109 mmHg    Target HR (85%) 145 bpm    Max. Pred. HR (100%) 171 bpm    Exercise duration (sec) 10 sec    Nuc Stress EF 63 %           The following portions of the patient's history were reviewed and updated as appropriate: allergies, current medications, past family history, past medical history, past social history, past surgical history and problem list.    Review of Systems   Constitutional: Negative for activity change, chills, fatigue and fever.   Respiratory: Negative for cough and shortness of breath.    Cardiovascular: Negative for chest pain and palpitations.   Gastrointestinal: Negative for abdominal pain.   Endocrine: Negative for cold intolerance.   Psychiatric/Behavioral: Negative for behavioral problems and dysphoric mood. The patient is not nervous/anxious.        Objective   Physical Exam   Constitutional: He appears well-developed and well-nourished.   Neck: Neck supple. No thyromegaly present.   Cardiovascular: Normal rate and regular rhythm.   No murmur heard.  Pulmonary/Chest: Effort normal and breath sounds normal.   Abdominal: Bowel sounds are normal. There is no tenderness.   Psychiatric: He has a normal mood and affect. His behavior is normal.   Nursing note and vitals reviewed.      Assessment/Plan   Alex was seen today for hypertension and hyperlipidemia.    Diagnoses and all orders for this visit:    Essential hypertension  -     carvedilol (COREG) 25 MG tablet; Take 1 tablet by mouth 2 (Two) Times a Day With Meals.  -     amLODIPine (NORVASC) 5 MG tablet; Take 1 tablet by mouth Daily.  -     hydroCHLOROthiazide (HYDRODIURIL) 25 MG tablet; Take 1 tablet by mouth Daily.  -     topiramate  (TOPAMAX) 25 MG tablet; Take 1 tablet by mouth 2 (Two) Times a Day.  -     valsartan (DIOVAN) 320 MG tablet; Take 1 tablet by mouth Daily.  -     Comprehensive metabolic panel  -     Lipid panel  -     CBC and Differential  -     TSH    Chronic nonintractable headache, unspecified headache type  -     topiramate (TOPAMAX) 25 MG tablet; Take 1 tablet by mouth 2 (Two) Times a Day.    Screening for prostate cancer  -     PSA

## 2020-12-13 NOTE — PROGRESS NOTES
Subjective   Alex Schafer is a 50 y.o. male.     CC: Video Visit for Medical Management    History of Present Illness     Chief Complaint:   Chief Complaint   Patient presents with   • Hypertension     med refill due    • Hyperlipidemia   • Headache   • covid 19     possible exposure to discuss per pt        Alex Schafer 50 y.o. male who presents today for Medical Management of the below listed issues and medication refills.  he has a problem list of   Patient Active Problem List   Diagnosis   • Hyperlipidemia   • Hypertension   • GERD (gastroesophageal reflux disease)   • Morbid obesity with body mass index of 60.0-69.9 in adult (CMS/HCC)   • GERBER on CPAP   • IFG (impaired fasting glucose)   .  Since the last visit, he has overall felt well. Had a very brief exposure to a coworker on 12/11/20 who then ended up with COVID yet pt reports he feels fantastic w/o issues and is simply watching for sx at this time. he has been compliant with   Current Outpatient Medications:   •  amLODIPine (NORVASC) 5 MG tablet, Take 1 tablet by mouth Daily., Disp: 90 tablet, Rfl: 1  •  aspirin 81 MG chewable tablet, Chew 81 mg Daily., Disp: , Rfl:   •  carvedilol (COREG) 25 MG tablet, Take 1 tablet by mouth 2 (Two) Times a Day With Meals., Disp: 180 tablet, Rfl: 1  •  hydroCHLOROthiazide (HYDRODIURIL) 25 MG tablet, Take 1 tablet by mouth Daily., Disp: 90 tablet, Rfl: 1  •  topiramate (TOPAMAX) 25 MG tablet, Take 1 tablet by mouth 2 (Two) Times a Day., Disp: 180 tablet, Rfl: 1  •  valsartan (DIOVAN) 320 MG tablet, Take 1 tablet by mouth Daily., Disp: 90 tablet, Rfl: 1.  he denies medication side effects.    All of the other chronic condition(s) listed above are stable w/o issues.    /88 Comment: dox visit  Pulse 88   Temp 97.6 °F (36.4 °C)   Resp 18     Results for orders placed or performed during the hospital encounter of 09/10/19   Stress Test With Myocardial Perfusion One Day   Result Value Ref Range    Nuclear Prior Study 2       CV STRESS PROTOCOL 1 Pharmacologic     Stage 1 1     HR Stage 1 92     BP Stage 1 161/98     Duration Min Stage 1 0     Duration Sec Stage 1 10     Stress Dose Regadenoson Stage 1 0.4     Stress Comments Stage 1 10 sec bolus injection     Baseline HR 74 bpm    Baseline /105 mmHg    Peak HR 92 bpm    Percent Max Pred HR 53.80 %    Percent Target HR 63 %    Peak /98 mmHg    Recovery HR 86 bpm    Recovery /109 mmHg    Target HR (85%) 145 bpm    Max. Pred. HR (100%) 171 bpm    Exercise duration (sec) 10 sec    Nuc Stress EF 63 %           The following portions of the patient's history were reviewed and updated as appropriate: allergies, current medications, past family history, past medical history, past social history, past surgical history and problem list.    Review of Systems   Constitutional: Negative for activity change, chills and fever.   Respiratory: Negative for cough.    Cardiovascular: Negative for chest pain.   Psychiatric/Behavioral: Negative for dysphoric mood.       Objective   Physical Exam  Constitutional:       General: He is not in acute distress.     Appearance: He is well-developed.   Pulmonary:      Effort: Pulmonary effort is normal.   Neurological:      Mental Status: He is alert and oriented to person, place, and time.   Psychiatric:         Behavior: Behavior normal.         Thought Content: Thought content normal.     Pt never completed his labs from 6/20 and is encouraged to do so promptly.    Assessment/Plan   Diagnoses and all orders for this visit:    1. Essential hypertension  -     amLODIPine (NORVASC) 5 MG tablet; Take 1 tablet by mouth Daily.  Dispense: 90 tablet; Refill: 1  -     carvedilol (COREG) 25 MG tablet; Take 1 tablet by mouth 2 (Two) Times a Day With Meals.  Dispense: 180 tablet; Refill: 1  -     hydroCHLOROthiazide (HYDRODIURIL) 25 MG tablet; Take 1 tablet by mouth Daily.  Dispense: 90 tablet; Refill: 1  -     topiramate (TOPAMAX) 25 MG tablet; Take  1 tablet by mouth 2 (Two) Times a Day.  Dispense: 180 tablet; Refill: 1  -     valsartan (DIOVAN) 320 MG tablet; Take 1 tablet by mouth Daily.  Dispense: 90 tablet; Refill: 1    2. Chronic nonintractable headache, unspecified headache type  -     topiramate (TOPAMAX) 25 MG tablet; Take 1 tablet by mouth 2 (Two) Times a Day.  Dispense: 180 tablet; Refill: 1    Spent  18   minutes with chart and interview and consent for this encounter given by the patient.  You have chosen to receive care through a telehealth visit.  Do you consent to use a video/audio connection for your medical care today? Yes

## 2020-12-14 ENCOUNTER — TELEMEDICINE (OUTPATIENT)
Dept: FAMILY MEDICINE CLINIC | Facility: CLINIC | Age: 50
End: 2020-12-14

## 2020-12-14 VITALS
HEART RATE: 88 BPM | TEMPERATURE: 97.6 F | RESPIRATION RATE: 18 BRPM | DIASTOLIC BLOOD PRESSURE: 88 MMHG | SYSTOLIC BLOOD PRESSURE: 128 MMHG

## 2020-12-14 DIAGNOSIS — G89.29 CHRONIC NONINTRACTABLE HEADACHE, UNSPECIFIED HEADACHE TYPE: ICD-10-CM

## 2020-12-14 DIAGNOSIS — R51.9 CHRONIC NONINTRACTABLE HEADACHE, UNSPECIFIED HEADACHE TYPE: ICD-10-CM

## 2020-12-14 DIAGNOSIS — I10 ESSENTIAL HYPERTENSION: ICD-10-CM

## 2020-12-14 PROCEDURE — 99213 OFFICE O/P EST LOW 20 MIN: CPT | Performed by: FAMILY MEDICINE

## 2020-12-14 RX ORDER — VALSARTAN 320 MG/1
320 TABLET ORAL DAILY
Qty: 90 TABLET | Refills: 1 | Status: SHIPPED | OUTPATIENT
Start: 2020-12-14 | End: 2021-08-04 | Stop reason: SDUPTHER

## 2020-12-14 RX ORDER — TOPIRAMATE 25 MG/1
25 TABLET ORAL 2 TIMES DAILY
Qty: 180 TABLET | Refills: 1 | Status: SHIPPED | OUTPATIENT
Start: 2020-12-14 | End: 2021-08-04 | Stop reason: SDUPTHER

## 2020-12-14 RX ORDER — AMLODIPINE BESYLATE 5 MG/1
5 TABLET ORAL DAILY
Qty: 90 TABLET | Refills: 1 | Status: SHIPPED | OUTPATIENT
Start: 2020-12-14 | End: 2021-08-04 | Stop reason: SDUPTHER

## 2020-12-14 RX ORDER — HYDROCHLOROTHIAZIDE 25 MG/1
25 TABLET ORAL DAILY
Qty: 90 TABLET | Refills: 1 | Status: SHIPPED | OUTPATIENT
Start: 2020-12-14 | End: 2021-08-04 | Stop reason: SDUPTHER

## 2020-12-14 RX ORDER — CARVEDILOL 25 MG/1
25 TABLET ORAL 2 TIMES DAILY WITH MEALS
Qty: 180 TABLET | Refills: 1 | Status: SHIPPED | OUTPATIENT
Start: 2020-12-14 | End: 2021-08-04 | Stop reason: SDUPTHER

## 2020-12-20 ENCOUNTER — IMMUNIZATION (OUTPATIENT)
Dept: VACCINE CLINIC | Facility: HOSPITAL | Age: 50
End: 2020-12-20

## 2020-12-20 PROCEDURE — 91300 HC SARSCOV02 VAC 30MCG/0.3ML IM: CPT | Performed by: INTERNAL MEDICINE

## 2020-12-20 PROCEDURE — 0001A: CPT | Performed by: INTERNAL MEDICINE

## 2021-01-07 ENCOUNTER — IMMUNIZATION (OUTPATIENT)
Dept: VACCINE CLINIC | Facility: HOSPITAL | Age: 51
End: 2021-01-07

## 2021-01-07 PROCEDURE — 0001A: CPT | Performed by: INTERNAL MEDICINE

## 2021-01-07 PROCEDURE — 0002A: CPT | Performed by: INTERNAL MEDICINE

## 2021-01-07 PROCEDURE — 91300 HC SARSCOV02 VAC 30MCG/0.3ML IM: CPT | Performed by: INTERNAL MEDICINE

## 2021-05-04 LAB
ALBUMIN SERPL-MCNC: 4.4 G/DL (ref 3.5–5.2)
ALBUMIN/GLOB SERPL: 1.8 G/DL
ALP SERPL-CCNC: 81 U/L (ref 39–117)
ALT SERPL-CCNC: 45 U/L (ref 1–41)
AST SERPL-CCNC: 25 U/L (ref 1–40)
BASOPHILS # BLD AUTO: 0.04 10*3/MM3 (ref 0–0.2)
BASOPHILS NFR BLD AUTO: 0.4 % (ref 0–1.5)
BILIRUB SERPL-MCNC: 0.6 MG/DL (ref 0–1.2)
BUN SERPL-MCNC: 16 MG/DL (ref 6–20)
BUN/CREAT SERPL: 14.7 (ref 7–25)
CALCIUM SERPL-MCNC: 9.6 MG/DL (ref 8.6–10.5)
CHLORIDE SERPL-SCNC: 104 MMOL/L (ref 98–107)
CHOLEST SERPL-MCNC: 184 MG/DL (ref 0–200)
CO2 SERPL-SCNC: 27.9 MMOL/L (ref 22–29)
CREAT SERPL-MCNC: 1.09 MG/DL (ref 0.76–1.27)
EOSINOPHIL # BLD AUTO: 0.19 10*3/MM3 (ref 0–0.4)
EOSINOPHIL NFR BLD AUTO: 2.1 % (ref 0.3–6.2)
ERYTHROCYTE [DISTWIDTH] IN BLOOD BY AUTOMATED COUNT: 12.3 % (ref 12.3–15.4)
GLOBULIN SER CALC-MCNC: 2.5 GM/DL
GLUCOSE SERPL-MCNC: 84 MG/DL (ref 65–99)
HCT VFR BLD AUTO: 43.3 % (ref 37.5–51)
HDLC SERPL-MCNC: 38 MG/DL (ref 40–60)
HGB BLD-MCNC: 14.8 G/DL (ref 13–17.7)
IMM GRANULOCYTES # BLD AUTO: 0.07 10*3/MM3 (ref 0–0.05)
IMM GRANULOCYTES NFR BLD AUTO: 0.8 % (ref 0–0.5)
LDLC SERPL CALC-MCNC: 132 MG/DL (ref 0–100)
LYMPHOCYTES # BLD AUTO: 2.63 10*3/MM3 (ref 0.7–3.1)
LYMPHOCYTES NFR BLD AUTO: 28.4 % (ref 19.6–45.3)
MCH RBC QN AUTO: 31.7 PG (ref 26.6–33)
MCHC RBC AUTO-ENTMCNC: 34.2 G/DL (ref 31.5–35.7)
MCV RBC AUTO: 92.7 FL (ref 79–97)
MONOCYTES # BLD AUTO: 0.77 10*3/MM3 (ref 0.1–0.9)
MONOCYTES NFR BLD AUTO: 8.3 % (ref 5–12)
NEUTROPHILS # BLD AUTO: 5.55 10*3/MM3 (ref 1.7–7)
NEUTROPHILS NFR BLD AUTO: 60 % (ref 42.7–76)
NRBC BLD AUTO-RTO: 0 /100 WBC (ref 0–0.2)
PLATELET # BLD AUTO: 237 10*3/MM3 (ref 140–450)
POTASSIUM SERPL-SCNC: 3.9 MMOL/L (ref 3.5–5.2)
PROT SERPL-MCNC: 6.9 G/DL (ref 6–8.5)
PSA SERPL-MCNC: 0.59 NG/ML (ref 0–4)
RBC # BLD AUTO: 4.67 10*6/MM3 (ref 4.14–5.8)
SODIUM SERPL-SCNC: 141 MMOL/L (ref 136–145)
TRIGL SERPL-MCNC: 75 MG/DL (ref 0–150)
TSH SERPL DL<=0.005 MIU/L-ACNC: 2.01 UIU/ML (ref 0.27–4.2)
VLDLC SERPL CALC-MCNC: 14 MG/DL (ref 5–40)
WBC # BLD AUTO: 9.25 10*3/MM3 (ref 3.4–10.8)

## 2021-08-04 NOTE — PROGRESS NOTES
"Subjective   Alex Schafer is a 50 y.o. male.     History of Present Illness     Chief Complaint:   Chief Complaint   Patient presents with   • Hypertension   • Headache   • Shortness of Breath   • Leg Swelling       Alex Schafer 50 y.o. male who presents today for Medical Management of the below listed issues and medication refills.  he has a problem list of   Patient Active Problem List   Diagnosis   • Hyperlipidemia   • Hypertension   • GERD (gastroesophageal reflux disease)   • Morbid obesity with body mass index of 60.0-69.9 in adult (CMS/HCC)   • GERBER on CPAP   • IFG (impaired fasting glucose)   .  Since the last visit, he has developed issues with increasing SEGURA and some bilateral LE edema. Pt reports these sx started over the past few weeks. 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 Progressively worse lately. Reports the sx are most noticed when laying down on the right side (at times). When the dyspnea gets it's worse, pt will have SSCP with it. FH: dad with CHF.   he has been compliant with   Current Outpatient Medications:   •  aspirin 81 MG chewable tablet, Chew 81 mg Daily., Disp: , Rfl:   •  amLODIPine (NORVASC) 5 MG tablet, Take 1 tablet by mouth Daily., Disp: 90 tablet, Rfl: 1  •  carvedilol (COREG) 25 MG tablet, Take 1 tablet by mouth 2 (Two) Times a Day With Meals., Disp: 180 tablet, Rfl: 1  •  hydroCHLOROthiazide (HYDRODIURIL) 25 MG tablet, Take 1 tablet by mouth Daily., Disp: 90 tablet, Rfl: 1  •  topiramate (TOPAMAX) 25 MG tablet, Take 1 tablet by mouth 2 (Two) Times a Day., Disp: 180 tablet, Rfl: 1  •  valsartan (DIOVAN) 320 MG tablet, Take 1 tablet by mouth Daily., Disp: 90 tablet, Rfl: 1.  he denies medication side effects.    All of the other chronic condition(s) listed above are stable w/o issues.    BP (!) 189/101   Pulse 67   Temp 96.8 °F (36 °C)   Resp 16   Ht 180.3 cm (71\")   Wt (!) 210 kg (464 lb)   SpO2 97%   BMI 64.71 kg/m²     Results for " orders placed or performed in visit on 06/15/20   Comprehensive metabolic panel    Specimen: Blood   Result Value Ref Range    Glucose 84 65 - 99 mg/dL    BUN 16 6 - 20 mg/dL    Creatinine 1.09 0.76 - 1.27 mg/dL    eGFR Non African Am 71 >60 mL/min/1.73    eGFR African Am 86 >60 mL/min/1.73    BUN/Creatinine Ratio 14.7 7.0 - 25.0    Sodium 141 136 - 145 mmol/L    Potassium 3.9 3.5 - 5.2 mmol/L    Chloride 104 98 - 107 mmol/L    Total CO2 27.9 22.0 - 29.0 mmol/L    Calcium 9.6 8.6 - 10.5 mg/dL    Total Protein 6.9 6.0 - 8.5 g/dL    Albumin 4.40 3.50 - 5.20 g/dL    Globulin 2.5 gm/dL    A/G Ratio 1.8 g/dL    Total Bilirubin 0.6 0.0 - 1.2 mg/dL    Alkaline Phosphatase 81 39 - 117 U/L    AST (SGOT) 25 1 - 40 U/L    ALT (SGPT) 45 (H) 1 - 41 U/L   Lipid panel    Specimen: Blood   Result Value Ref Range    Total Cholesterol 184 0 - 200 mg/dL    Triglycerides 75 0 - 150 mg/dL    HDL Cholesterol 38 (L) 40 - 60 mg/dL    VLDL Cholesterol Marvel 14 5 - 40 mg/dL    LDL Chol Calc (NIH) 132 (H) 0 - 100 mg/dL   TSH    Specimen: Blood   Result Value Ref Range    TSH 2.010 0.270 - 4.200 uIU/mL   PSA    Specimen: Blood   Result Value Ref Range    PSA 0.594 0.000 - 4.000 ng/mL   CBC and Differential    Specimen: Blood   Result Value Ref Range    WBC 9.25 3.40 - 10.80 10*3/mm3    RBC 4.67 4.14 - 5.80 10*6/mm3    Hemoglobin 14.8 13.0 - 17.7 g/dL    Hematocrit 43.3 37.5 - 51.0 %    MCV 92.7 79.0 - 97.0 fL    MCH 31.7 26.6 - 33.0 pg    MCHC 34.2 31.5 - 35.7 g/dL    RDW 12.3 12.3 - 15.4 %    Platelets 237 140 - 450 10*3/mm3    Neutrophil Rel % 60.0 42.7 - 76.0 %    Lymphocyte Rel % 28.4 19.6 - 45.3 %    Monocyte Rel % 8.3 5.0 - 12.0 %    Eosinophil Rel % 2.1 0.3 - 6.2 %    Basophil Rel % 0.4 0.0 - 1.5 %    Neutrophils Absolute 5.55 1.70 - 7.00 10*3/mm3    Lymphocytes Absolute 2.63 0.70 - 3.10 10*3/mm3    Monocytes Absolute 0.77 0.10 - 0.90 10*3/mm3    Eosinophils Absolute 0.19 0.00 - 0.40 10*3/mm3    Basophils Absolute 0.04 0.00 - 0.20  10*3/mm3    Immature Granulocyte Rel % 0.8 (H) 0.0 - 0.5 %    Immature Grans Absolute 0.07 (H) 0.00 - 0.05 10*3/mm3    nRBC 0.0 0.0 - 0.2 /100 WBC           The following portions of the patient's history were reviewed and updated as appropriate: allergies, current medications, past family history, past medical history, past social history, past surgical history and problem list.    Review of Systems   Constitutional: Negative for fever.   HENT: Negative for ear pain, rhinorrhea and sore throat.    Respiratory: Positive for shortness of breath and wheezing.    Cardiovascular: Positive for chest pain and leg swelling.   Gastrointestinal: Negative for abdominal pain and vomiting.   Musculoskeletal: Positive for neck pain.   Skin: Negative for rash.   Neurological: Negative for headaches.       Objective   Physical Exam  Constitutional:       General: He is not in acute distress.     Appearance: He is well-developed.   Cardiovascular:      Rate and Rhythm: Normal rate and regular rhythm.      Comments: No edema currently.  Pulmonary:      Effort: Pulmonary effort is normal.      Breath sounds: Normal breath sounds.   Neurological:      Mental Status: He is alert and oriented to person, place, and time.   Psychiatric:         Behavior: Behavior normal.         Thought Content: Thought content normal.     Labs from 5/21 reviewed and good.    Assessment/Plan   Diagnoses and all orders for this visit:    1. Dyspnea on exertion (Primary)    2. Chest pain on breathing    3. Essential hypertension  -     amLODIPine (NORVASC) 5 MG tablet; Take 1 tablet by mouth Daily.  Dispense: 90 tablet; Refill: 1  -     carvedilol (COREG) 25 MG tablet; Take 1 tablet by mouth 2 (Two) Times a Day With Meals.  Dispense: 180 tablet; Refill: 1  -     hydroCHLOROthiazide (HYDRODIURIL) 25 MG tablet; Take 1 tablet by mouth Daily.  Dispense: 90 tablet; Refill: 1  -     topiramate (TOPAMAX) 25 MG tablet; Take 1 tablet by mouth 2 (Two) Times a Day.   Dispense: 180 tablet; Refill: 1  -     valsartan (DIOVAN) 320 MG tablet; Take 1 tablet by mouth Daily.  Dispense: 90 tablet; Refill: 1    4. Chronic nonintractable headache, unspecified headache type  -     topiramate (TOPAMAX) 25 MG tablet; Take 1 tablet by mouth 2 (Two) Times a Day.  Dispense: 180 tablet; Refill: 1    Will have pt be seen today in the [facility] today for full w/u.

## 2021-08-05 ENCOUNTER — OFFICE VISIT (OUTPATIENT)
Dept: FAMILY MEDICINE CLINIC | Facility: CLINIC | Age: 51
End: 2021-08-05

## 2021-08-05 ENCOUNTER — HOSPITAL ENCOUNTER (OUTPATIENT)
Dept: CARDIOLOGY | Facility: HOSPITAL | Age: 51
Discharge: HOME | End: 2021-08-05

## 2021-08-05 VITALS
WEIGHT: 315 LBS | RESPIRATION RATE: 16 BRPM | BODY MASS INDEX: 44.1 KG/M2 | SYSTOLIC BLOOD PRESSURE: 189 MMHG | DIASTOLIC BLOOD PRESSURE: 101 MMHG | OXYGEN SATURATION: 97 % | HEIGHT: 71 IN | TEMPERATURE: 96.8 F | HEART RATE: 67 BPM

## 2021-08-05 VITALS
TEMPERATURE: 98.2 F | WEIGHT: 315 LBS | OXYGEN SATURATION: 95 % | HEART RATE: 67 BPM | DIASTOLIC BLOOD PRESSURE: 91 MMHG | BODY MASS INDEX: 44.1 KG/M2 | HEIGHT: 71 IN | SYSTOLIC BLOOD PRESSURE: 155 MMHG

## 2021-08-05 DIAGNOSIS — I10 ESSENTIAL HYPERTENSION: ICD-10-CM

## 2021-08-05 DIAGNOSIS — R06.02 SHORTNESS OF BREATH: ICD-10-CM

## 2021-08-05 DIAGNOSIS — I10 ESSENTIAL HYPERTENSION: Primary | ICD-10-CM

## 2021-08-05 DIAGNOSIS — I10 ESSENTIAL HYPERTENSION: Chronic | ICD-10-CM

## 2021-08-05 DIAGNOSIS — R07.2 PRECORDIAL PAIN: ICD-10-CM

## 2021-08-05 DIAGNOSIS — R06.09 DYSPNEA ON EXERTION: Primary | ICD-10-CM

## 2021-08-05 DIAGNOSIS — G89.29 CHRONIC NONINTRACTABLE HEADACHE, UNSPECIFIED HEADACHE TYPE: Chronic | ICD-10-CM

## 2021-08-05 DIAGNOSIS — R07.1 CHEST PAIN ON BREATHING: ICD-10-CM

## 2021-08-05 DIAGNOSIS — R51.9 CHRONIC NONINTRACTABLE HEADACHE, UNSPECIFIED HEADACHE TYPE: Chronic | ICD-10-CM

## 2021-08-05 LAB
ALBUMIN SERPL-MCNC: 4.3 G/DL (ref 3.5–5.2)
ALBUMIN/GLOB SERPL: 1.3 G/DL
ALP SERPL-CCNC: 73 U/L (ref 39–117)
ALT SERPL W P-5'-P-CCNC: 50 U/L (ref 1–41)
ANION GAP SERPL CALCULATED.3IONS-SCNC: 12.2 MMOL/L (ref 5–15)
AST SERPL-CCNC: 28 U/L (ref 1–40)
BASOPHILS # BLD AUTO: 0.04 10*3/MM3 (ref 0–0.2)
BASOPHILS NFR BLD AUTO: 0.5 % (ref 0–1.5)
BILIRUB SERPL-MCNC: 0.7 MG/DL (ref 0–1.2)
BUN SERPL-MCNC: 17 MG/DL (ref 6–20)
BUN/CREAT SERPL: 13.9 (ref 7–25)
CALCIUM SPEC-SCNC: 9.1 MG/DL (ref 8.6–10.5)
CHLORIDE SERPL-SCNC: 102 MMOL/L (ref 98–107)
CO2 SERPL-SCNC: 25.8 MMOL/L (ref 22–29)
CREAT SERPL-MCNC: 1.22 MG/DL (ref 0.76–1.27)
D DIMER PPP FEU-MCNC: <0.27 MCGFEU/ML (ref 0–0.49)
DEPRECATED RDW RBC AUTO: 42.2 FL (ref 37–54)
EOSINOPHIL # BLD AUTO: 0.18 10*3/MM3 (ref 0–0.4)
EOSINOPHIL NFR BLD AUTO: 2.2 % (ref 0.3–6.2)
ERYTHROCYTE [DISTWIDTH] IN BLOOD BY AUTOMATED COUNT: 12.3 % (ref 12.3–15.4)
GFR SERPL CREATININE-BSD FRML MDRD: 63 ML/MIN/1.73
GLOBULIN UR ELPH-MCNC: 3.3 GM/DL
GLUCOSE SERPL-MCNC: 106 MG/DL (ref 65–99)
HCT VFR BLD AUTO: 42.6 % (ref 37.5–51)
HGB BLD-MCNC: 14.5 G/DL (ref 13–17.7)
IMM GRANULOCYTES # BLD AUTO: 0.03 10*3/MM3 (ref 0–0.05)
IMM GRANULOCYTES NFR BLD AUTO: 0.4 % (ref 0–0.5)
LYMPHOCYTES # BLD AUTO: 2.08 10*3/MM3 (ref 0.7–3.1)
LYMPHOCYTES NFR BLD AUTO: 25.9 % (ref 19.6–45.3)
MCH RBC QN AUTO: 32 PG (ref 26.6–33)
MCHC RBC AUTO-ENTMCNC: 34 G/DL (ref 31.5–35.7)
MCV RBC AUTO: 94 FL (ref 79–97)
MONOCYTES # BLD AUTO: 0.74 10*3/MM3 (ref 0.1–0.9)
MONOCYTES NFR BLD AUTO: 9.2 % (ref 5–12)
NEUTROPHILS NFR BLD AUTO: 4.97 10*3/MM3 (ref 1.7–7)
NEUTROPHILS NFR BLD AUTO: 61.8 % (ref 42.7–76)
NRBC BLD AUTO-RTO: 0 /100 WBC (ref 0–0.2)
NT-PROBNP SERPL-MCNC: 215.6 PG/ML (ref 0–900)
PLATELET # BLD AUTO: 269 10*3/MM3 (ref 140–450)
PMV BLD AUTO: 10.4 FL (ref 6–12)
POTASSIUM SERPL-SCNC: 3.9 MMOL/L (ref 3.5–5.2)
PROT SERPL-MCNC: 7.6 G/DL (ref 6–8.5)
RBC # BLD AUTO: 4.53 10*6/MM3 (ref 4.14–5.8)
SODIUM SERPL-SCNC: 140 MMOL/L (ref 136–145)
TROPONIN T SERPL-MCNC: 0.01 NG/ML (ref 0–0.03)
WBC # BLD AUTO: 8.04 10*3/MM3 (ref 3.4–10.8)

## 2021-08-05 PROCEDURE — 83880 ASSAY OF NATRIURETIC PEPTIDE: CPT | Performed by: INTERNAL MEDICINE

## 2021-08-05 PROCEDURE — 36415 COLL VENOUS BLD VENIPUNCTURE: CPT

## 2021-08-05 PROCEDURE — 85379 FIBRIN DEGRADATION QUANT: CPT | Performed by: INTERNAL MEDICINE

## 2021-08-05 PROCEDURE — 85025 COMPLETE CBC W/AUTO DIFF WBC: CPT

## 2021-08-05 PROCEDURE — 84484 ASSAY OF TROPONIN QUANT: CPT | Performed by: INTERNAL MEDICINE

## 2021-08-05 PROCEDURE — 25010000002 PERFLUTREN (DEFINITY) 8.476 MG IN SODIUM CHLORIDE (PF) 0.9 % 10 ML INJECTION: Performed by: INTERNAL MEDICINE

## 2021-08-05 PROCEDURE — 99214 OFFICE O/P EST MOD 30 MIN: CPT | Performed by: INTERNAL MEDICINE

## 2021-08-05 PROCEDURE — 93306 TTE W/DOPPLER COMPLETE: CPT

## 2021-08-05 PROCEDURE — 80053 COMPREHEN METABOLIC PANEL: CPT

## 2021-08-05 PROCEDURE — 93306 TTE W/DOPPLER COMPLETE: CPT | Performed by: INTERNAL MEDICINE

## 2021-08-05 PROCEDURE — 99214 OFFICE O/P EST MOD 30 MIN: CPT | Performed by: FAMILY MEDICINE

## 2021-08-05 PROCEDURE — 94760 N-INVAS EAR/PLS OXIMETRY 1: CPT

## 2021-08-05 PROCEDURE — 93005 ELECTROCARDIOGRAM TRACING: CPT | Performed by: INTERNAL MEDICINE

## 2021-08-05 RX ORDER — FUROSEMIDE 40 MG/1
40 TABLET ORAL DAILY
Qty: 30 TABLET | Refills: 11 | Status: SHIPPED | OUTPATIENT
Start: 2021-08-05 | End: 2021-08-10

## 2021-08-05 RX ORDER — AMLODIPINE BESYLATE 5 MG/1
5 TABLET ORAL DAILY
Qty: 90 TABLET | Refills: 1 | Status: SHIPPED | OUTPATIENT
Start: 2021-08-05

## 2021-08-05 RX ORDER — CARVEDILOL 25 MG/1
25 TABLET ORAL 2 TIMES DAILY WITH MEALS
Qty: 180 TABLET | Refills: 1 | Status: SHIPPED | OUTPATIENT
Start: 2021-08-05

## 2021-08-05 RX ORDER — TOPIRAMATE 25 MG/1
25 TABLET ORAL 2 TIMES DAILY
Qty: 180 TABLET | Refills: 1 | Status: SHIPPED | OUTPATIENT
Start: 2021-08-05

## 2021-08-05 RX ORDER — HYDROCHLOROTHIAZIDE 25 MG/1
25 TABLET ORAL DAILY
Qty: 90 TABLET | Refills: 1 | Status: SHIPPED | OUTPATIENT
Start: 2021-08-05 | End: 2021-08-18

## 2021-08-05 RX ORDER — NITROGLYCERIN 0.4 MG/1
0.4 TABLET SUBLINGUAL
Status: SHIPPED | OUTPATIENT
Start: 2021-08-05

## 2021-08-05 RX ORDER — VALSARTAN 320 MG/1
320 TABLET ORAL DAILY
Qty: 90 TABLET | Refills: 1 | Status: SHIPPED | OUTPATIENT
Start: 2021-08-05

## 2021-08-05 RX ORDER — SODIUM CHLORIDE 0.9 % (FLUSH) 0.9 %
10 SYRINGE (ML) INJECTION AS NEEDED
Status: SHIPPED | OUTPATIENT
Start: 2021-08-05

## 2021-08-05 RX ADMIN — PERFLUTREN 1.5 ML: 6.52 INJECTION, SUSPENSION INTRAVENOUS at 14:14

## 2021-08-05 NOTE — ADDENDUM NOTE
Encounter addended by: Darby Rodriguez RN on: 8/5/2021 2:47 PM   Actions taken: LDA properties accepted

## 2021-08-05 NOTE — PROGRESS NOTES
Date of Office Visit: 2021  Encounter Provider: Raza Rodriguez MD  Place of Service: [facility] CARDIOLOGY  Patient Name: Alex Schafer  :1970    Chief complaint: Shortness of breath, chest pain.    History of Present Illness:    I had the pleasure of seeing the patient in our CEC unit on 2021.  He is a very pleasant 50 year-old male with morbid obesity and hypertension who presents for evaluation.  He is normally followed by Dr. Ritter in our group.    The patient had a full cardiac evaluation in 2019 which was unremarkable.  This included a nuclear stress test and echocardiogram.  He states that over the last several weeks, he has been progressively short of breath with exertion.  He states that he walks at a brisk pace, and often has to stop because of this.  He has also had difficulty sleeping on his right side because of shortness of breath.  His blood pressure has been elevated, and was 189/101 when he arrived, although it came down to 155/91 later.  He does tell me that his oxygen saturation occasionally has dropped to 83% with exertion, although it quickly rebounds.  He has also had sharp substernal chest pain which does not radiate, although it does correlate to exertion, and to the shortness of breath.  His EKG from today showed normal sinus rhythm with a nonspecific interventricular conduction delay.  There were no ischemic changes.  He does not have diabetes.  He is on multiple blood pressure medications, which are discussed below.    Past Medical History:   Diagnosis Date   • Abdominal pain    • Asthma, exercise induced    • Back pain    • Bipolar disorder (CMS/HCC)    • Chest pain    • Chronic peptic ulcer    • Depression    • Diarrhea    • Difficulty sleeping    • Diverticulitis    • Dizziness    • Erectile dysfunction    • Fatigue    • Fever    • Gas    • GERD (gastroesophageal reflux disease)    • Headache    • Heart murmur    • Hepatitis A virus  "infection    • Hyperlipidemia    • Hypertension    • Irregular heart beat    • Itching    • Leg swelling    • Nausea    • Nervousness    • Palpitations    • Pneumonia    • Rectal bleeding    • Renal failure     \"small\"   • Seasonal allergies    • Sleep apnea    • Stomach cramps    • Vomiting        Past Surgical History:   Procedure Laterality Date   • COLONOSCOPY     • TONSILLECTOMY     • TONSILLECTOMY         Current Outpatient Medications on File Prior to Encounter   Medication Sig Dispense Refill   • amLODIPine (NORVASC) 5 MG tablet Take 1 tablet by mouth Daily. 90 tablet 1   • aspirin 81 MG chewable tablet Chew 81 mg Daily.     • carvedilol (COREG) 25 MG tablet Take 1 tablet by mouth 2 (Two) Times a Day With Meals. 180 tablet 1   • hydroCHLOROthiazide (HYDRODIURIL) 25 MG tablet Take 1 tablet by mouth Daily. 90 tablet 1   • topiramate (TOPAMAX) 25 MG tablet Take 1 tablet by mouth 2 (Two) Times a Day. 180 tablet 1   • valsartan (DIOVAN) 320 MG tablet Take 1 tablet by mouth Daily. 90 tablet 1     No current facility-administered medications on file prior to encounter.     Allergies as of 2021 - Reviewed 2021   Allergen Reaction Noted   • Shellfish allergy Other (See Comments) 2016   • Lisinopril Other (See Comments) 2016     Social History     Socioeconomic History   • Marital status:      Spouse name: Not on file   • Number of children: Not on file   • Years of education: Not on file   • Highest education level: Not on file   Tobacco Use   • Smoking status: Former Smoker     Quit date: 2000     Years since quittin.6   • Smokeless tobacco: Never Used   • Tobacco comment: caffeine - 4-5 sodas daily   Vaping Use   • Vaping Use: Never used   Substance and Sexual Activity   • Alcohol use: Yes     Comment: Rare   • Drug use: No     Family History   Problem Relation Age of Onset   • Hypertension Mother    • Ovarian cancer Mother    • Colon polyps Mother    • Other Mother    • Dementia " "Mother    • Other Father         Cardiac Pacemaker   • Hypertension Father    • Heart disease Father    • Heart failure Father    • Autoimmune disease Sister    • Hypertension Sister    • Stroke Maternal Grandmother         CVA   • Prostate cancer Maternal Grandfather    • Heart attack Paternal Grandfather    • Heart disease Paternal Grandfather    • Hypertension Sister        Review of Systems   Constitutional: Positive for malaise/fatigue.   Cardiovascular: Positive for chest pain and dyspnea on exertion.   Respiratory: Positive for shortness of breath.    All other systems reviewed and are negative.     Objective:     Vitals:    08/05/21 1026   BP: 155/91   BP Location: Right arm   Patient Position: Sitting   Pulse: 67   Temp: 98.2 °F (36.8 °C)   TempSrc: Oral   SpO2: 95%   Weight: (!) 209 kg (460 lb)   Height: 180.3 cm (71\")     Body mass index is 64.16 kg/m².    Constitutional:       Appearance: Well-developed.      Comments: Morbidly obese   Eyes:      Conjunctiva/sclera: Conjunctivae normal.   HENT:      Head: Normocephalic and atraumatic.   Pulmonary:      Effort: Pulmonary effort is normal.      Breath sounds: Normal breath sounds.   Cardiovascular:      Normal rate. Regular rhythm.      Murmurs: There is no murmur.      No gallop.   Edema:     Comments: Trace edema of the lower extremities bilaterally  Abdominal:      Palpations: Abdomen is soft.      Tenderness: There is no abdominal tenderness.   Musculoskeletal:      Cervical back: Neck supple. Skin:     General: Skin is warm.   Neurological:      Mental Status: Alert and oriented to person, place, and time.   Psychiatric:         Behavior: Behavior normal.       Lab Review:     ECG 12 Lead    Date/Time: 8/5/2021 1:59 PM  Performed by: Raza Rodriguez MD  Authorized by: Raza Rodriguez MD   Comparison: compared with previous ECG from 8/27/2019  Similar to previous ECG  Rhythm: sinus rhythm  Rate: normal  BPM: 69    Clinical impression: " non-specific ECG  Comments: Borderline IVCD            Lipid Panel    Lipid Panel 5/3/21   Total Cholesterol 184   Triglycerides 75   HDL Cholesterol 38 (A)   VLDL Cholesterol 14   LDL Cholesterol  132 (A)   (A) Abnormal value       Comments are available for some flowsheets but are not being displayed.                  Assessment:       Diagnosis Plan   1. Precordial pain  Cardiac Monitoring    Vital Signs - Once    Vital Signs - As Needed    Pulse Oximetry    Oxygen Therapy- Nasal Cannula; Titrate for SPO2: 92%, equal to or greater than    Insert Peripheral IV    sodium chloride 0.9 % flush 10 mL    nitroglycerin (NITROSTAT) SL tablet 0.4 mg    NPO Diet    Bathroom Privileges With Assistance    CBC & Differential    Comprehensive Metabolic Panel    Troponin T    D-Dimer    proBNP    ECG 12 Lead    Cardiac Monitoring    Vital Signs - Once    Insert Peripheral IV    NPO Diet    Bathroom Privileges With Assistance    Troponin T    Troponin T    D-Dimer    D-Dimer    proBNP    proBNP    Adult Transthoracic Echo Complete w/ Color, Spectral and Contrast if Necessary Per Protocol   2. Shortness of breath  Cardiac Monitoring    Vital Signs - Once    Vital Signs - As Needed    Pulse Oximetry    Oxygen Therapy- Nasal Cannula; Titrate for SPO2: 92%, equal to or greater than    Insert Peripheral IV    sodium chloride 0.9 % flush 10 mL    nitroglycerin (NITROSTAT) SL tablet 0.4 mg    NPO Diet    Bathroom Privileges With Assistance    CBC & Differential    Comprehensive Metabolic Panel    Troponin T    D-Dimer    proBNP    ECG 12 Lead    Cardiac Monitoring    Vital Signs - Once    Insert Peripheral IV    NPO Diet    Bathroom Privileges With Assistance    Troponin T    Troponin T    D-Dimer    D-Dimer    proBNP    proBNP    Adult Transthoracic Echo Complete w/ Color, Spectral and Contrast if Necessary Per Protocol   3. Essential hypertension  Cardiac Monitoring    Vital Signs - Once    Vital Signs - As Needed    Pulse Oximetry     Oxygen Therapy- Nasal Cannula; Titrate for SPO2: 92%, equal to or greater than    Insert Peripheral IV    sodium chloride 0.9 % flush 10 mL    nitroglycerin (NITROSTAT) SL tablet 0.4 mg    NPO Diet    Bathroom Privileges With Assistance    CBC & Differential    Comprehensive Metabolic Panel    Troponin T    D-Dimer    proBNP    ECG 12 Lead    Cardiac Monitoring    Vital Signs - Once    Insert Peripheral IV    NPO Diet    Bathroom Privileges With Assistance    Troponin T    Troponin T    D-Dimer    D-Dimer    proBNP    proBNP     Plan:       The etiology for the patient's shortness of breath and chest discomfort is in question.  He is morbidly obese, although this is a distinct change from his baseline.  He also has had sharp pain substernally, which does not radiate.  This may all be driven by his hypertension, although he certainly needs a work-up.  I am going to start with an echocardiogram.  He will need some sort of ischemic evaluation in the future, which I will decide after the echocardiogram.  He is currently on amlodipine 5 mg/day, carvedilol 25 mg twice a day, HCTZ 25 mg/day, and Diovan 325 mg/day.  I am going to potentially start him on a diuretic, possibly spironolactone or a loop diuretic.  However, I will also decide this after his echocardiogram.

## 2021-08-06 LAB
AORTIC ARCH: 3 CM
ASCENDING AORTA: 3.8 CM
BH CV ECHO MEAS - ACS: 2.2 CM
BH CV ECHO MEAS - AO MAX PG (FULL): 7.2 MMHG
BH CV ECHO MEAS - AO MAX PG: 10.1 MMHG
BH CV ECHO MEAS - AO MEAN PG (FULL): 4 MMHG
BH CV ECHO MEAS - AO MEAN PG: 6 MMHG
BH CV ECHO MEAS - AO ROOT AREA (BSA CORRECTED): 1.4
BH CV ECHO MEAS - AO ROOT AREA: 10.8 CM^2
BH CV ECHO MEAS - AO ROOT DIAM: 3.7 CM
BH CV ECHO MEAS - AO V2 MAX: 159 CM/SEC
BH CV ECHO MEAS - AO V2 MEAN: 113 CM/SEC
BH CV ECHO MEAS - AO V2 VTI: 28.8 CM
BH CV ECHO MEAS - ASC AORTA: 3.8 CM
BH CV ECHO MEAS - AVA(I,A): 2.2 CM^2
BH CV ECHO MEAS - AVA(I,D): 2.2 CM^2
BH CV ECHO MEAS - AVA(V,A): 2 CM^2
BH CV ECHO MEAS - AVA(V,D): 2 CM^2
BH CV ECHO MEAS - BSA(HAYCOCK): 3.2 M^2
BH CV ECHO MEAS - BSA: 2.7 M^2
BH CV ECHO MEAS - BZI_BMI: 86.9 KILOGRAMS/M^2
BH CV ECHO MEAS - BZI_METRIC_HEIGHT: 154.9 CM
BH CV ECHO MEAS - BZI_METRIC_WEIGHT: 208.7 KG
BH CV ECHO MEAS - EDV(MOD-SP2): 148 ML
BH CV ECHO MEAS - EDV(MOD-SP4): 156 ML
BH CV ECHO MEAS - EDV(TEICH): 123.8 ML
BH CV ECHO MEAS - EF(CUBED): 61.8 %
BH CV ECHO MEAS - EF(MOD-BP): 60 %
BH CV ECHO MEAS - EF(MOD-SP2): 58.8 %
BH CV ECHO MEAS - EF(MOD-SP4): 60.3 %
BH CV ECHO MEAS - EF(TEICH): 53.1 %
BH CV ECHO MEAS - ESV(MOD-SP2): 61 ML
BH CV ECHO MEAS - ESV(MOD-SP4): 62 ML
BH CV ECHO MEAS - ESV(TEICH): 58.1 ML
BH CV ECHO MEAS - FS: 27.5 %
BH CV ECHO MEAS - IVS/LVPW: 1
BH CV ECHO MEAS - IVSD: 1.3 CM
BH CV ECHO MEAS - LAT PEAK E' VEL: 7.8 CM/SEC
BH CV ECHO MEAS - LV DIASTOLIC VOL/BSA (35-75): 58 ML/M^2
BH CV ECHO MEAS - LV MASS(C)D: 270.1 GRAMS
BH CV ECHO MEAS - LV MASS(C)DI: 100.3 GRAMS/M^2
BH CV ECHO MEAS - LV MAX PG: 2.9 MMHG
BH CV ECHO MEAS - LV MEAN PG: 2 MMHG
BH CV ECHO MEAS - LV SYSTOLIC VOL/BSA (12-30): 23 ML/M^2
BH CV ECHO MEAS - LV V1 MAX: 84.9 CM/SEC
BH CV ECHO MEAS - LV V1 MEAN: 68 CM/SEC
BH CV ECHO MEAS - LV V1 VTI: 16.7 CM
BH CV ECHO MEAS - LVIDD: 5.1 CM
BH CV ECHO MEAS - LVIDS: 3.7 CM
BH CV ECHO MEAS - LVLD AP2: 8.6 CM
BH CV ECHO MEAS - LVLD AP4: 8.8 CM
BH CV ECHO MEAS - LVLS AP2: 7.3 CM
BH CV ECHO MEAS - LVLS AP4: 7.4 CM
BH CV ECHO MEAS - LVOT AREA (M): 3.8 CM^2
BH CV ECHO MEAS - LVOT AREA: 3.8 CM^2
BH CV ECHO MEAS - LVOT DIAM: 2.2 CM
BH CV ECHO MEAS - LVPWD: 1.3 CM
BH CV ECHO MEAS - MED PEAK E' VEL: 7.3 CM/SEC
BH CV ECHO MEAS - MR MAX PG: 46 MMHG
BH CV ECHO MEAS - MR MAX VEL: 339 CM/SEC
BH CV ECHO MEAS - MV A DUR: 0.09 SEC
BH CV ECHO MEAS - MV A MAX VEL: 57.4 CM/SEC
BH CV ECHO MEAS - MV DEC SLOPE: 319 CM/SEC^2
BH CV ECHO MEAS - MV DEC TIME: 0.14 SEC
BH CV ECHO MEAS - MV E MAX VEL: 99.8 CM/SEC
BH CV ECHO MEAS - MV E/A: 1.7
BH CV ECHO MEAS - MV MAX PG: 5.1 MMHG
BH CV ECHO MEAS - MV MEAN PG: 2 MMHG
BH CV ECHO MEAS - MV P1/2T MAX VEL: 113 CM/SEC
BH CV ECHO MEAS - MV P1/2T: 103.8 MSEC
BH CV ECHO MEAS - MV V2 MAX: 113 CM/SEC
BH CV ECHO MEAS - MV V2 MEAN: 64.8 CM/SEC
BH CV ECHO MEAS - MV V2 VTI: 35 CM
BH CV ECHO MEAS - MVA P1/2T LCG: 1.9 CM^2
BH CV ECHO MEAS - MVA(P1/2T): 2.1 CM^2
BH CV ECHO MEAS - MVA(VTI): 1.8 CM^2
BH CV ECHO MEAS - PA ACC TIME: 0.11 SEC
BH CV ECHO MEAS - PA MAX PG (FULL): 2.3 MMHG
BH CV ECHO MEAS - PA MAX PG: 3.7 MMHG
BH CV ECHO MEAS - PA PR(ACCEL): 31.3 MMHG
BH CV ECHO MEAS - PA V2 MAX: 96.4 CM/SEC
BH CV ECHO MEAS - PULM A REVS DUR: 0.1 SEC
BH CV ECHO MEAS - PULM A REVS VEL: 29.1 CM/SEC
BH CV ECHO MEAS - PULM DIAS VEL: 74.1 CM/SEC
BH CV ECHO MEAS - PULM S/D: 0.77
BH CV ECHO MEAS - PULM SYS VEL: 57.4 CM/SEC
BH CV ECHO MEAS - PVA(V,A): 2.6 CM^2
BH CV ECHO MEAS - PVA(V,D): 2.6 CM^2
BH CV ECHO MEAS - QP/QS: 0.88
BH CV ECHO MEAS - RAP SYSTOLE: 3 MMHG
BH CV ECHO MEAS - RV MAX PG: 1.4 MMHG
BH CV ECHO MEAS - RV MEAN PG: 1 MMHG
BH CV ECHO MEAS - RV V1 MAX: 59.4 CM/SEC
BH CV ECHO MEAS - RV V1 MEAN: 46 CM/SEC
BH CV ECHO MEAS - RV V1 VTI: 13.4 CM
BH CV ECHO MEAS - RVOT AREA: 4.2 CM^2
BH CV ECHO MEAS - RVOT DIAM: 2.3 CM
BH CV ECHO MEAS - RVSP: 27 MMHG
BH CV ECHO MEAS - SI(AO): 115.1 ML/M^2
BH CV ECHO MEAS - SI(CUBED): 30.5 ML/M^2
BH CV ECHO MEAS - SI(LVOT): 23.6 ML/M^2
BH CV ECHO MEAS - SI(MOD-SP2): 32.3 ML/M^2
BH CV ECHO MEAS - SI(MOD-SP4): 34.9 ML/M^2
BH CV ECHO MEAS - SI(TEICH): 24.4 ML/M^2
BH CV ECHO MEAS - SV(AO): 309.7 ML
BH CV ECHO MEAS - SV(CUBED): 82 ML
BH CV ECHO MEAS - SV(LVOT): 63.5 ML
BH CV ECHO MEAS - SV(MOD-SP2): 87 ML
BH CV ECHO MEAS - SV(MOD-SP4): 94 ML
BH CV ECHO MEAS - SV(RVOT): 55.7 ML
BH CV ECHO MEAS - SV(TEICH): 65.7 ML
BH CV ECHO MEAS - TAPSE (>1.6): 3 CM
BH CV ECHO MEAS - TR MAX VEL: 244 CM/SEC
BH CV ECHO MEASUREMENTS AVERAGE E/E' RATIO: 13.22
BH CV XLRA - RV BASE: 3.1 CM
BH CV XLRA - RV LENGTH: 9.3 CM
BH CV XLRA - RV MID: 2.9 CM
BH CV XLRA - TDI S': 13.6 CM/SEC
LEFT ATRIUM VOLUME INDEX: 36 ML/M2
MAXIMAL PREDICTED HEART RATE: 169 BPM
SINUS: 3.1 CM
STJ: 3.5 CM
STRESS TARGET HR: 144 BPM

## 2021-08-10 ENCOUNTER — OFFICE VISIT (OUTPATIENT)
Dept: CARDIOLOGY | Facility: CLINIC | Age: 51
End: 2021-08-10

## 2021-08-10 ENCOUNTER — LAB (OUTPATIENT)
Dept: LAB | Facility: HOSPITAL | Age: 51
End: 2021-08-10

## 2021-08-10 VITALS
DIASTOLIC BLOOD PRESSURE: 84 MMHG | SYSTOLIC BLOOD PRESSURE: 126 MMHG | HEIGHT: 71 IN | HEART RATE: 64 BPM | BODY MASS INDEX: 44.1 KG/M2 | OXYGEN SATURATION: 96 % | WEIGHT: 315 LBS

## 2021-08-10 DIAGNOSIS — R06.02 SHORTNESS OF BREATH: ICD-10-CM

## 2021-08-10 DIAGNOSIS — N17.9 AKI (ACUTE KIDNEY INJURY) (HCC): ICD-10-CM

## 2021-08-10 DIAGNOSIS — I10 ESSENTIAL HYPERTENSION: Primary | ICD-10-CM

## 2021-08-10 DIAGNOSIS — R07.2 PRECORDIAL PAIN: ICD-10-CM

## 2021-08-10 DIAGNOSIS — I10 ESSENTIAL HYPERTENSION: ICD-10-CM

## 2021-08-10 LAB
ANION GAP SERPL CALCULATED.3IONS-SCNC: 15.6 MMOL/L (ref 5–15)
BUN SERPL-MCNC: 37 MG/DL (ref 6–20)
BUN/CREAT SERPL: 17.6 (ref 7–25)
CALCIUM SPEC-SCNC: 9.4 MG/DL (ref 8.6–10.5)
CHLORIDE SERPL-SCNC: 100 MMOL/L (ref 98–107)
CO2 SERPL-SCNC: 24.4 MMOL/L (ref 22–29)
CREAT SERPL-MCNC: 2.1 MG/DL (ref 0.76–1.27)
GFR SERPL CREATININE-BSD FRML MDRD: 33 ML/MIN/1.73
GLUCOSE SERPL-MCNC: 118 MG/DL (ref 65–99)
POTASSIUM SERPL-SCNC: 4.1 MMOL/L (ref 3.5–5.2)
SODIUM SERPL-SCNC: 140 MMOL/L (ref 136–145)

## 2021-08-10 PROCEDURE — 36415 COLL VENOUS BLD VENIPUNCTURE: CPT

## 2021-08-10 PROCEDURE — 99214 OFFICE O/P EST MOD 30 MIN: CPT | Performed by: NURSE PRACTITIONER

## 2021-08-10 PROCEDURE — 80048 BASIC METABOLIC PNL TOTAL CA: CPT

## 2021-08-10 RX ORDER — FUROSEMIDE 20 MG/1
20 TABLET ORAL DAILY
Qty: 30 TABLET | Refills: 11 | Status: SHIPPED | OUTPATIENT
Start: 2021-08-10 | End: 2021-10-05 | Stop reason: SDUPTHER

## 2021-08-10 NOTE — PROGRESS NOTES
"    CARDIOLOGY        Patient Name: Alex Schafer  :1970  Age: 50 y.o.  Primary Cardiologist: Sunday Ritter MD  Encounter Provider:  THAI Christie    Date of Service: 08/10/21          CHIEF COMPLAINT / REASON FOR OFFICE VISIT     Precordial pain ([facility] f/u )      HISTORY OF PRESENT ILLNESS       HPI  Alex Schafer is a 50 y.o. male who presents today for [facility] re-evaluation.     Pt has a  history significant for obesity, hypertension.    Patient was evaluated by Dr. Yanes 2021 in the CEC where he was having chest discomfort and shortness of breath.  He was found to be hypertensive and felt to be volume overloaded so furosemide was added to his regimen.  Patient reports that he continues to have some fatigue that is somewhat stable for him.  Reports that breathing has improved especially with exertion.  He does note that over the weekend he was able to walk around the [facility] in [place] without any breathing discomforts.  He notes that his blood pressure is improved.  He did have an outpatient BMP performed prior to arrival where creatinine was noted to be 2.2, which is up from his baseline.    The following portions of the patient's history were reviewed and updated as appropriate: allergies, current medications, past family history, past medical history, past social history, past surgical history and problem list.      VITAL SIGNS     Visit Vitals  /84 (BP Location: Left arm, Patient Position: Sitting, Cuff Size: Large Adult)   Pulse 64   Ht 180.3 cm (71\")   Wt (!) 206 kg (454 lb)   SpO2 96%   BMI 63.32 kg/m²         Wt Readings from Last 3 Encounters:   08/10/21 (!) 206 kg (454 lb)   21 (!) 209 kg (460 lb)   21 (!) 210 kg (464 lb)     Body mass index is 63.32 kg/m².      REVIEW OF SYSTEMS   Review of Systems   Constitutional: Positive for malaise/fatigue. Negative for chills, fever, weight gain and weight loss.   Cardiovascular: Negative for leg " swelling.   Respiratory: Negative for cough, snoring and wheezing.    Hematologic/Lymphatic: Negative for bleeding problem. Does not bruise/bleed easily.   Skin: Negative for color change.   Musculoskeletal: Negative for falls, joint pain and myalgias.   Gastrointestinal: Negative for melena.   Genitourinary: Negative for hematuria.   Neurological: Negative for excessive daytime sleepiness.   Psychiatric/Behavioral: Negative for depression. The patient is not nervous/anxious.            PHYSICAL EXAMINATION     Constitutional:       Appearance: Normal appearance. Well-developed. Morbidly obese.   Eyes:      Conjunctiva/sclera: Conjunctivae normal.   Neck:      Vascular: No carotid bruit.   Pulmonary:      Effort: Pulmonary effort is normal.      Breath sounds: Normal breath sounds.   Cardiovascular:      Normal rate. Regular rhythm. Normal S1. Normal S2.      Murmurs: There is no murmur.      No gallop. No click. No rub.   Edema:     Peripheral edema absent.   Musculoskeletal: Normal range of motion. Skin:     General: Skin is warm and dry.   Neurological:      Mental Status: Alert and oriented to person, place, and time.      GCS: GCS eye subscore is 4. GCS verbal subscore is 5. GCS motor subscore is 6.   Psychiatric:         Speech: Speech normal.         Behavior: Behavior normal.         Thought Content: Thought content normal.         Judgment: Judgment normal.           REVIEWED DATA     Procedures    Cardiac Procedures:  1. Echocardiogram 9/10/2019.  LVEF 60%.  All LV wall segments contract normally.  Mild LVH.  Aortic valve exhibits thickening.  Mild MAC.  Tricuspid valve is normal.  2. 24-hour Holter 9/13/2019.  Normal monitor study.  3. Myocardial perfusion stress test 9/10/2019.  Normal myocardial perfusion study without evidence of ischemia.  4. Echocardiogram 8/5/2021.  LVEF 61-65%.  Grade 2 diastolic dysfunction.  Normal RV cavity size and systolic function.  LAE.  Calculated RVSP 27 mmHg.    Lipid  Panel    Lipid Panel 5/3/21   Total Cholesterol 184   Triglycerides 75   HDL Cholesterol 38 (A)   VLDL Cholesterol 14   LDL Cholesterol  132 (A)   (A) Abnormal value       Comments are available for some flowsheets but are not being displayed.               ASSESSMENT & PLAN      Diagnosis Plan   1. Essential hypertension  Basic Metabolic Panel   2. BENI (acute kidney injury) (CMS/[facility])  Basic Metabolic Panel   3. Precordial pain  Stress Test With Pet Myocardial Perfusion (MULTI STUDY, REST AND STRESS)   4. Shortness of breath  Stress Test With Pet Myocardial Perfusion (MULTI STUDY, REST AND STRESS)         SUMMARY/DISCUSSION  1. Hypertension.  Blood pressure currently improved at 126/84.  Continue amlodipine 5 mg/day, carvedilol 25 mg twice per day, HCTZ 25 mg/day, valsartan 320 mg/day.  2. BENI.  Patient found to have elevated creatinine since initiation of furosemide.  Creatinine 2.2.  Patient advised to decrease furosemide to 20 mg/day with repeat BMP early next week.  3. Precordial pain and shortness of breath.  Shortness of breath and chest discomfort have improved with initiation of furosemide.  Patient does have risk factors for ischemic disease and I would recommend proceeding with a PET nuclear stress test.  Patient has obesity and this would be the most sensitive and specific test for him.  4. Follow-up arrangements to be made after repeat BMP and stress test.        MEDICATIONS         Discharge Medications          Accurate as of August 10, 2021 11:59 PM. If you have any questions, ask your nurse or doctor.            Changes to Medications      Instructions Start Date   furosemide 20 MG tablet  Commonly known as: LASIX  What changed:   · medication strength  · how much to take  Changed by: THAI Christie   20 mg, Oral, Daily         Continue These Medications      Instructions Start Date   amLODIPine 5 MG tablet  Commonly known as: NORVASC   5 mg, Oral, Daily      aspirin 81 MG chewable tablet    81 mg, Oral, Daily      carvedilol 25 MG tablet  Commonly known as: COREG   25 mg, Oral, 2 Times Daily With Meals      hydroCHLOROthiazide 25 MG tablet  Commonly known as: HYDRODIURIL   25 mg, Oral, Daily      topiramate 25 MG tablet  Commonly known as: TOPAMAX   25 mg, Oral, 2 Times Daily      valsartan 320 MG tablet  Commonly known as: DIOVAN   320 mg, Oral, Daily                 **Dragon Disclaimer:   Much of this encounter note is an electronic transcription/translation of spoken language to printed text. The electronic translation of spoken language may permit erroneous, or at times, nonsensical words or phrases to be inadvertently transcribed. Although I have reviewed the note for such errors, some may still exist.

## 2021-08-18 DIAGNOSIS — I10 ESSENTIAL HYPERTENSION: Primary | ICD-10-CM

## 2021-08-18 DIAGNOSIS — N17.9 AKI (ACUTE KIDNEY INJURY) (HCC): ICD-10-CM

## 2021-08-26 DIAGNOSIS — N17.9 AKI (ACUTE KIDNEY INJURY) (HCC): Primary | ICD-10-CM

## 2021-09-13 ENCOUNTER — HOSPITAL ENCOUNTER (OUTPATIENT)
Dept: CARDIOLOGY | Facility: HOSPITAL | Age: 51
Discharge: HOME | End: 2021-09-13
Admitting: NURSE PRACTITIONER

## 2021-09-13 VITALS — WEIGHT: 315 LBS | BODY MASS INDEX: 44.1 KG/M2 | HEIGHT: 71 IN

## 2021-09-13 DIAGNOSIS — R06.02 SHORTNESS OF BREATH: ICD-10-CM

## 2021-09-13 DIAGNOSIS — R07.2 PRECORDIAL PAIN: ICD-10-CM

## 2021-09-13 PROCEDURE — 78451 HT MUSCLE IMAGE SPECT SING: CPT | Performed by: INTERNAL MEDICINE

## 2021-09-13 PROCEDURE — 25010000002 REGADENOSON 0.4 MG/5ML SOLUTION: Performed by: NURSE PRACTITIONER

## 2021-09-13 PROCEDURE — 93017 CV STRESS TEST TRACING ONLY: CPT

## 2021-09-13 PROCEDURE — 78451 HT MUSCLE IMAGE SPECT SING: CPT

## 2021-09-13 PROCEDURE — A9502 TC99M TETROFOSMIN: HCPCS | Performed by: NURSE PRACTITIONER

## 2021-09-13 PROCEDURE — 0 TECHNETIUM TETROFOSMIN KIT: Performed by: NURSE PRACTITIONER

## 2021-09-13 PROCEDURE — 93018 CV STRESS TEST I&R ONLY: CPT | Performed by: INTERNAL MEDICINE

## 2021-09-13 PROCEDURE — 93016 CV STRESS TEST SUPVJ ONLY: CPT | Performed by: INTERNAL MEDICINE

## 2021-09-13 RX ADMIN — REGADENOSON 0.4 MG: 0.08 INJECTION, SOLUTION INTRAVENOUS at 10:05

## 2021-09-13 RX ADMIN — TETROFOSMIN 1 DOSE: 1.38 INJECTION, POWDER, LYOPHILIZED, FOR SOLUTION INTRAVENOUS at 10:05

## 2021-09-14 LAB
BH CV NUCLEAR PRIOR STUDY: 1
BH CV STRESS BP STAGE 1: NORMAL
BH CV STRESS COMMENTS STAGE 1: NORMAL
BH CV STRESS DOSE REGADENOSON STAGE 1: 0.4
BH CV STRESS DURATION MIN STAGE 1: 0
BH CV STRESS DURATION SEC STAGE 1: 10
BH CV STRESS HR STAGE 1: 109
BH CV STRESS NUCLEAR ISOTOPE DOSE: 41.4 MCI
BH CV STRESS PROTOCOL 1: NORMAL
BH CV STRESS RECOVERY BP: NORMAL MMHG
BH CV STRESS RECOVERY HR: 90 BPM
BH CV STRESS STAGE 1: 1
LV EF NUC BP: 52 %
MAXIMAL PREDICTED HEART RATE: 169 BPM
PERCENT MAX PREDICTED HR: 64.5 %
STRESS BASELINE BP: NORMAL MMHG
STRESS BASELINE HR: 85 BPM
STRESS PERCENT HR: 76 %
STRESS POST EXERCISE DUR SEC: 10 SEC
STRESS POST PEAK BP: NORMAL MMHG
STRESS POST PEAK HR: 109 BPM
STRESS TARGET HR: 144 BPM

## 2021-09-14 NOTE — PROGRESS NOTES
Patient made aware of normal stress test results. He had a referral placed to nephrology and has not heard from them for the appointment yet. Scheduling, can someone reach out to try to make arrangements for patient to have a consultation with nephrology for elevated creatinine and hypertension.

## 2021-09-18 NOTE — PROGRESS NOTES
Subjective   Alex Schafer is a 50 y.o. male.     CC: Management of Back Pain/Kidney Issues    History of Present Illness     Pt returns today after trip to the [facility] on 8/31/21 for upper right back pain. That HPI was as follows:    Patient reports driving to and from wedding and developing significant upper back/ shoulder pain. He has been taking 2 aleve twice daily over the counter and ran out, started taking ibuprofen 800 mg around the clock with tylenol and an old prescription of a few pills of flexeril without relief. ROS: He recently was sent to cardiologist for blood pressure and breathing problems and started on Lasix. He was also referred to nephrology due to BENI  Ice pack to R trapezius muscle area   Dexamethasone 10 mg IM given   Prednisone 50 mg po qd with breakfast #5. No RF Start Wednesday   Flexeril 10 mg 1 po tid prn muscle spasms #30. No RF   Use ice packs to shoulder/ upper back to decrease pain/ inflammation. Use heat to relax muscles. So can alternate/ use whichever works best for you.   Apply topical aspercreme with lidocaine to sore areas   Per Dr. Modi, no ibuprofen or aleve at all, no aspirin orally except per cardiology. May use tylenol, max 1 gm 4 times daily.    Pt had originally been sent to cardiology by me for SSCP and, thankfully, was normal on testing except for a bump I his creatinine (usually normal, went to 2.02).    No prior injury with the shoulder remembered.  The steroids seemed to help.     The following portions of the patient's history were reviewed and updated as appropriate: allergies, current medications, past family history, past medical history, past social history, past surgical history and problem list.    Review of Systems   Constitutional: Negative for activity change, chills and fever.   Respiratory: Negative for cough.    Cardiovascular: Positive for chest pain.   Gastrointestinal: Negative for abdominal pain.   Genitourinary: Negative for dysuria.  "  Musculoskeletal: Positive for back pain.   Neurological: Positive for headaches.   Psychiatric/Behavioral: Negative for dysphoric mood.       /100 Comment: pt takes bp med at night  Pulse 96   Temp 97.1 °F (36.2 °C) (Oral)   Resp 18   Ht 180 cm (70.87\")   Wt (!) 209 kg (460 lb)   BMI 64.39 kg/m²     Objective   Physical Exam  Constitutional:       General: He is not in acute distress.     Appearance: He is well-developed.   Pulmonary:      Effort: Pulmonary effort is normal.   Musculoskeletal:         General: Tenderness (right Rhomboid region) present.   Neurological:      Mental Status: He is alert and oriented to person, place, and time.   Psychiatric:         Behavior: Behavior normal.         Thought Content: Thought content normal.         Assessment/Plan   Diagnoses and all orders for this visit:    1. Strain of rhomboid muscle, initial encounter (Primary)  -     metaxalone (Skelaxin) 800 MG tablet; Take 1 tablet by mouth 3 (Three) Times a Day As Needed for Muscle Spasms.  Dispense: 42 tablet; Refill: 2  -     predniSONE (DELTASONE) 20 MG tablet; Take 3 tabs QDPC x 3 days then 2 tabs QDPC x 4 days then 1 tab QDPC x 3 days  Dispense: 20 tablet; Refill: 0  -     Ambulatory Referral to Physical Therapy Evaluate and treat    2. BENI (acute kidney injury) (CMS/[facility])    The referral to Nephrology was made by cardiology and pt encouraged to avoid NSAIDS and hydrate aggressively.  Pt being seen today.          "

## 2021-09-20 ENCOUNTER — OFFICE VISIT (OUTPATIENT)
Dept: FAMILY MEDICINE CLINIC | Facility: CLINIC | Age: 51
End: 2021-09-20

## 2021-09-20 VITALS
WEIGHT: 315 LBS | RESPIRATION RATE: 18 BRPM | BODY MASS INDEX: 44.1 KG/M2 | TEMPERATURE: 97.1 F | SYSTOLIC BLOOD PRESSURE: 171 MMHG | DIASTOLIC BLOOD PRESSURE: 100 MMHG | HEART RATE: 96 BPM | HEIGHT: 71 IN

## 2021-09-20 DIAGNOSIS — S29.012A STRAIN OF RHOMBOID MUSCLE, INITIAL ENCOUNTER: Primary | ICD-10-CM

## 2021-09-20 DIAGNOSIS — N17.9 AKI (ACUTE KIDNEY INJURY) (HCC): ICD-10-CM

## 2021-09-20 PROCEDURE — 99213 OFFICE O/P EST LOW 20 MIN: CPT | Performed by: FAMILY MEDICINE

## 2021-09-20 RX ORDER — METAXALONE 800 MG/1
800 TABLET ORAL 3 TIMES DAILY PRN
Qty: 42 TABLET | Refills: 2 | Status: SHIPPED | OUTPATIENT
Start: 2021-09-20

## 2021-09-20 RX ORDER — PREDNISONE 20 MG/1
TABLET ORAL
Qty: 20 TABLET | Refills: 0 | Status: SHIPPED | OUTPATIENT
Start: 2021-09-20

## 2021-09-30 ENCOUNTER — TRANSCRIBE ORDERS (OUTPATIENT)
Dept: ADMINISTRATIVE | Facility: HOSPITAL | Age: 51
End: 2021-09-30

## 2021-09-30 DIAGNOSIS — R31.9 HEMATURIA SYNDROME: Primary | ICD-10-CM

## 2021-10-05 ENCOUNTER — IMMUNIZATION (OUTPATIENT)
Dept: VACCINE CLINIC | Facility: HOSPITAL | Age: 51
End: 2021-10-05

## 2021-10-05 ENCOUNTER — HOSPITAL ENCOUNTER (OUTPATIENT)
Dept: CT IMAGING | Facility: HOSPITAL | Age: 51
Discharge: HOME | End: 2021-10-05
Admitting: INTERNAL MEDICINE

## 2021-10-05 DIAGNOSIS — R31.9 HEMATURIA SYNDROME: ICD-10-CM

## 2021-10-05 PROCEDURE — 0003A: CPT | Performed by: INTERNAL MEDICINE

## 2021-10-05 PROCEDURE — 0004A ADM SARSCOV2 30MCG/0.3ML BOOSTER: CPT | Performed by: INTERNAL MEDICINE

## 2021-10-05 PROCEDURE — 74176 CT ABD & PELVIS W/O CONTRAST: CPT

## 2021-10-05 PROCEDURE — 91300 HC SARSCOV02 VAC 30MCG/0.3ML IM: CPT | Performed by: INTERNAL MEDICINE

## 2021-10-05 RX ORDER — FUROSEMIDE 40 MG/1
40 TABLET ORAL DAILY
Qty: 90 TABLET | Refills: 3 | Status: SHIPPED | OUTPATIENT
Start: 2021-10-05

## 2021-10-19 ENCOUNTER — HOSPITAL ENCOUNTER (OUTPATIENT)
Dept: PHYSICAL THERAPY | Facility: HOSPITAL | Age: 51
Setting detail: THERAPIES SERIES
Discharge: HOME | End: 2021-10-19

## 2021-10-19 DIAGNOSIS — M54.2 CERVICAL PAIN: Primary | ICD-10-CM

## 2021-10-19 DIAGNOSIS — R29.3 POOR POSTURE: ICD-10-CM

## 2021-10-19 DIAGNOSIS — M54.12 CERVICAL RADICULOPATHY: ICD-10-CM

## 2021-10-19 PROCEDURE — 97161 PT EVAL LOW COMPLEX 20 MIN: CPT

## 2021-10-19 NOTE — THERAPY EVALUATION
"    Outpatient Physical Therapy Ortho Initial Evaluation  [facility]     Patient Name: Alex Schafer  : 1970  MRN: [id number]  Today's Date: 10/19/2021      Visit Date: 10/19/2021    Patient Active Problem List   Diagnosis   • Hyperlipidemia   • Hypertension   • GERD (gastroesophageal reflux disease)   • Morbid obesity with body mass index of 60.0-69.9 in adult ([facility])   • GERBER on CPAP   • IFG (impaired fasting glucose)        Past Medical History:   Diagnosis Date   • Abdominal pain    • Asthma, exercise induced    • Back pain    • Bipolar disorder (HCC)    • Chest pain    • Chronic peptic ulcer    • Depression    • Diarrhea    • Difficulty sleeping    • Diverticulitis    • Dizziness    • Erectile dysfunction    • Fatigue    • Fever    • Gas    • GERD (gastroesophageal reflux disease)    • Headache    • Heart murmur    • Hepatitis A virus infection    • Hyperlipidemia    • Hypertension    • Irregular heart beat    • Itching    • Leg swelling    • Nausea    • Nervousness    • Palpitations    • Pneumonia    • Rectal bleeding    • Renal failure     \"small\"   • Seasonal allergies    • Sleep apnea    • Stomach cramps    • Vomiting         Past Surgical History:   Procedure Laterality Date   • COLONOSCOPY     • TONSILLECTOMY     • TONSILLECTOMY         Visit Dx:     ICD-10-CM ICD-9-CM   1. Cervical pain  M54.2 723.1   2. Poor posture  R29.3 781.92   3. Cervical radiculopathy  M54.12 723.4          Patient History     Row Name 10/19/21 0900 10/17/21 1843          History    Type of Pain -- Back pain; Chest pain; Neck pain; Upper Extremity / Arm; Other pain  -CN (r) patient (t)     Other Type of Pain -- Knees  -CN (r) patient (t)     Date Current Problem(s) Began 21  -CN 21  -CN (r) patient (t)     Brief Description of Current Complaint Pt reports onset of pain in late August/early September after driving to [place] to  parents for a wedding. Pt with extreme pain following drive with radiation " into R UE/hand. Went to urgent care and PCP and prescribed steroid. Pt reporting slow improvement in symptoms with continued pins and needles into 4th and 5th digits. 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 Avoiding long drives and lifting at this time. No imaging to date.  -CN Pain in rt upper back.  Pins and needles radiating down rt arm.  -CN (r) patient (t)     Patient/Caregiver Goals -- Relieve pain; Heal wound  -CN (r) patient (t)     Hand Dominance right-handed  -CN right-handed  -CN (r) patient (t)     Occupation/sports/leisure activities -- Walking  -CN (r) patient (t)     Patient seeing anyone else for problem(s)? -- Not pt related.  -CN (r) patient (t)     What clinical tests have you had for this problem? -- Other 1 (comment)  -CN (r) patient (t)     Additional Clinical Tests -- No tests done.  Dr. Modi just examined and diagnosed based on that.  -CN (r) patient (t)            Pain     Pain Location Neck  -CN --     Pain at Present 3  -CN --     Pain at Best 0  -CN --     Pain at Worst 8  -CN --     Pain Frequency Intermittent  -CN --     Pain Description Burning; Numbness; Tingling  -CN --     What Performance Factors Make the Current Problem(s) WORSE? Sitting at computer, lying on R side, cervical rotation  -CN --     What Performance Factors Make the Current Problem(s) BETTER? Massage, steriods, hot shower  -CN --     Is your sleep disturbed? Yes  -CN --     Difficulties at work? Yes, works in the [employer]; on computer/phone  -CN --     Difficulties with ADL's? Yes, reaching into cabinets, 12 year old daughter  -CN --     Difficulties with recreational activities? Yes, mini golf with daughter aggrivated it, walking  -CN --            Fall Risk Assessment    Any falls in the past year: Yes  -CN Yes  -CN (r) patient (t)     Number of falls reported in the last 12 months several  -CN --     Factors that contributed to the fall: Lost balance; Fatigue; Other (comment)  -CN  Lost balance; Fatigue; Other (comment)  -CN (r) patient (t)     Other factors that contributed to the fall: -- Knee gave out.  -CN (r) patient (t)            Services    Prior Rehab/Home Health Experiences -- No  -CN (r) patient (t)     Are you currently receiving Home Health services -- No  -CN (r) patient (t)     Do you plan to receive Home Health services in the near future -- No  -CN (r) patient (t)            Daily Activities    Primary Language English  -CN English  -CN (r) patient (t)     How does patient learn best? Listening; Reading; Demonstration  -CN Listening; Reading; Demonstration  -CN (r) patient (t)     Barriers to learning None  -CN --     Pt Participated in POC and Goals Yes  -CN --            Safety    Are you being hurt, hit, or frightened by anyone at home or in your life? No  -CN No  -CN (r) patient (t)     Are you being neglected by a caregiver No  -CN No  -CN (r) patient (t)     Have you had any of the following issues with -- N/A  -CN (r) patient (t)           User Key  (r) = Recorded By, (t) = Taken By, (c) = Cosigned By    Initials Name Provider Type    CN Mira Mart, PT Physical Therapist    patient Alex Schafer --                 PT Ortho     Row Name 10/19/21 0900       Posture/Observations    Alignment Options Rounded shoulders; Scapular elevation  -CN    Rounded Shoulders Moderate  -CN    Scapular Elevation Left:; Elevated  -CN       Sensory Screen for Light Touch- Upper Quarter Clearing    C4 (posterior shoulder) Bilateral:; Intact  -CN    C5 (lateral upper arm) Bilateral:; Intact  -CN    C6 (tip of thumb) Bilateral:; Intact  -CN    C7 (tip of 3rd finger) Bilateral:; Intact  -CN    C8 (tip of 5th finger) Bilateral:; Intact  -CN    T1 (medial lower arm) Bilateral:; Intact  -CN       Myotomal Screen- Upper Quarter Clearing    Shoulder flexion (C5) Bilateral:; 5 (Normal)  -CN    Elbow flexion/wrist extension (C6) Bilateral:; 5 (Normal)  -CN    Elbow extension/wrist  flexion (C7) Bilateral:; 5 (Normal)  -CN       Cervical/Shoulder ROM Screen    Cervical flexion Normal  -CN    Cervical extension Normal  -CN    Cervical lateral flexion Impaired  B=75% with R sided pain B  -CN    Cervical rotation Impaired  B=75% with R sided pain B  -CN       Cervical Palpation    Suboccipital Tender  -CN    Levator Scapula Tender; Guarded/taut  -CN    Upper Traps Tender; Guarded/taut  -CN       Cervical Accessory Motions    PA glide- C4 Hypomobile  -CN    PA glide- C6 Hypomobile  -CN    PA glide- C7 Hypomobile  -CN        Strength Right    Right  Test 1 60  -CN    Right  Test 2 60  -CN    Right  Test 3 58  -CN     Strength Average Right 59.33  -CN        Strength Left    Left  Test 1 72  -CN    Left  Test 2 68  -CN    Left  Test 3 69  -CN     Strength Average Left 69.67  -CN       Upper Extremity Flexibility    Suboccipitals Mildly limited  -CN    Upper Trapezius Moderately limited  -CN    Levator Scapula Moderately limited  -CN    Pect Minor Moderately limited  -CN    Pect Major Moderately limited  -CN          User Key  (r) = Recorded By, (t) = Taken By, (c) = Cosigned By    Initials Name Provider Type    Mira Hope, PT Physical Therapist                            Therapy Education  Education Details: Anatomy, goals of PT, eval findings, posture, work set up, avoiding using laptop on couch/in bed.  Given: Symptoms/condition management, Pain management, Posture/body mechanics  Program: New  How Provided: Verbal, Demonstration  Provided to: Patient  Level of Understanding: Teach back education performed, Verbalized, Demonstrated      PT OP Goals     Row Name 10/19/21 1400          PT Short Term Goals    STG Date to Achieve 11/19/21  -CN     STG 1 Pt will report pain rated 4/10 at worst in order to demonstrate ability to return to normalized ADLs and functional activities.  -CN     STG 1 Progress New  -CN     STG 2 Pt will be independent  with initial HEP for symptom management.  -CN     STG 2 Progress New  -CN            Long Term Goals    LTG Date to Achieve 12/19/21  -CN     LTG 1 Pt will be independent and compliant with advanced HEP for long term management of symptoms and prevention of future occurrence.  -CN     LTG 1 Progress New  -CN     LTG 2 Pt will reduce level of perceived disability as measured by the quick DASH  to 15% in order to improve QOL.  -CN     LTG 2 Progress New  -CN     LTG 3 Pt will report centralization of symptoms to R shoulder/neck in order to improve ability to perform work and home tasks.  -CN     LTG 3 Progress New  -CN     LTG 4 Pt will demonstrate R hand  strength to 70# in order to improve ability to perform fine motor tasks.  -CN     LTG 4 Progress New  -CN            Time Calculation    PT Goal Re-Cert Due Date 11/19/21  -CN           User Key  (r) = Recorded By, (t) = Taken By, (c) = Cosigned By    Initials Name Provider Type    Mira Hope, PT Physical Therapist                 PT Assessment/Plan     Row Name 10/19/21 1001          PT Assessment    Functional Limitations Limitation in home management; Limitations in functional capacity and performance; Performance in leisure activities; Performance in self-care ADL; Performance in work activities  -CN     Impairments Impaired flexibility; Joint mobility; Muscle strength; Pain; Poor body mechanics; Posture; Range of motion  -CN     Assessment Comments 50 y.o. male referred to outpatient physical therapy for evaluation and treatment of right cervical radiculopathy.  Patient presents with poor posture, decreased R  strength, numbness/tingling R 4th/5th digits, painful cervical rotation and SBing, tenderness/guarding through UT/levator, suboccipitals and relief of symptoms with cervical distraction. Pt's signs and symptoms are consistent with cervical radiculopathy.  Pertinent comorbidities and personal factors that may affect progress  include, but are not limited to, increased body habitus, CHF, kidney disease, sedentary lifestyle, desk job.  Pt scored 30% on the quick DASH where 0% is no disability and is in stable clinical condition. Pt would benefit from skilled PT to address functional deficits and return to PLOF.  -CN     Please refer to paper survey for additional self-reported information Yes  -CN     Rehab Potential Good  -CN     Patient/caregiver participated in establishment of treatment plan and goals Yes  -CN     Patient would benefit from skilled therapy intervention Yes  -CN            PT Plan    PT Frequency 2x/week  -CN     Predicted Duration of Therapy Intervention (PT) 10-12 visits  -CN     Planned CPT's? PT EVAL LOW COMPLEXITY: 30145; PT RE-EVAL: 22741; PT THER PROC EA 15 MIN: 47150; PT THER ACT EA 15 MIN: 68324; PT MANUAL THERAPY EA 15 MIN: 15420; PT NEUROMUSC RE-EDUCATION EA 15 MIN: 93900; PT AQUATIC THERAPY EA 15 MIN: 43305; PT SELF CARE/HOME MGMT/TRAIN EA 15: 37464; PT HOT OR COLD PACK TREAT [facility]; PT ELECTRICAL STIM UNATTEND: ; PT TRACTION CERVICAL: 65905  -CN     PT Plan Comments Assess response to evaluation and consider manual stm, suboccipital release and cervical distraction, chin tucks, post shoulder rolls, doorway pec stretch, scap squeeze, UT/levator stretch, SL arcs next visit. May also consider cervical traction.  -CN           User Key  (r) = Recorded By, (t) = Taken By, (c) = Cosigned By    Initials Name Provider Type    Mira Hope, BRITTANY Physical Therapist                   OP Exercises     Row Name 10/19/21 1400             Subjective Comments    Subjective Comments Pt arrived at 9:27 for 9:15 appointment. Did not initiate HEP today per time constraints.  -CN            User Key  (r) = Recorded By, (t) = Taken By, (c) = Cosigned By    Initials Name Provider Type    Mira Hope, BRITTANY Physical Therapist                              Outcome Measure Options: Quick DASH (30% where 0%  is no disability)         Time Calculation:     Start Time: 0927  Stop Time: 1000  Time Calculation (min): 33 min     Therapy Charges for Today     Code Description Service Date Service Provider Modifiers Qty    22791979970  PT EVAL LOW COMPLEXITY 2 10/19/2021 Mira Mart, PT GP 1          PT G-Codes  Outcome Measure Options: Quick DASH (30% where 0% is no disability)         Mira Mart, PT  10/19/2021

## 2021-10-25 ENCOUNTER — HOSPITAL ENCOUNTER (OUTPATIENT)
Dept: PHYSICAL THERAPY | Facility: HOSPITAL | Age: 51
Setting detail: THERAPIES SERIES
Discharge: HOME | End: 2021-10-25

## 2021-10-25 DIAGNOSIS — M54.2 CERVICAL PAIN: Primary | ICD-10-CM

## 2021-10-25 DIAGNOSIS — M54.12 CERVICAL RADICULOPATHY: ICD-10-CM

## 2021-10-25 DIAGNOSIS — R29.3 POOR POSTURE: ICD-10-CM

## 2021-10-25 PROCEDURE — 97110 THERAPEUTIC EXERCISES: CPT

## 2021-10-25 PROCEDURE — 97140 MANUAL THERAPY 1/> REGIONS: CPT

## 2021-10-25 NOTE — THERAPY TREATMENT NOTE
"    Outpatient Physical Therapy Ortho Treatment Note  [facility]     Patient Name: Alex Schafer  : 1970  MRN: [id number]  Today's Date: 10/25/2021      Visit Date: 10/25/2021    Visit Dx:    ICD-10-CM ICD-9-CM   1. Cervical pain  M54.2 723.1   2. Poor posture  R29.3 781.92   3. Cervical radiculopathy  M54.12 723.4       Patient Active Problem List   Diagnosis   • Hyperlipidemia   • Hypertension   • GERD (gastroesophageal reflux disease)   • Morbid obesity with body mass index of 60.0-69.9 in adult ([facility])   • GERBER on CPAP   • IFG (impaired fasting glucose)        Past Medical History:   Diagnosis Date   • Abdominal pain    • Asthma, exercise induced    • Back pain    • Bipolar disorder ([facility])    • Chest pain    • Chronic peptic ulcer    • Depression    • Diarrhea    • Difficulty sleeping    • Diverticulitis    • Dizziness    • Erectile dysfunction    • Fatigue    • Fever    • Gas    • GERD (gastroesophageal reflux disease)    • Headache    • Heart murmur    • Hepatitis A virus infection    • Hyperlipidemia    • Hypertension    • Irregular heart beat    • Itching    • Leg swelling    • Nausea    • Nervousness    • Palpitations    • Pneumonia    • Rectal bleeding    • Renal failure     \"small\"   • Seasonal allergies    • Sleep apnea    • Stomach cramps    • Vomiting         Past Surgical History:   Procedure Laterality Date   • COLONOSCOPY     • TONSILLECTOMY     • TONSILLECTOMY                          PT Assessment/Plan     Row Name 10/25/21 1312          PT Assessment    Assessment Comments Pt returns for initial follow up after evaluation reporting no change in symtpoms to date and slight increase in soreness following last session. Performed manual therapy with reports of no radicular symptoms after and added several postural exercises. 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 Admininstered HEP with education on optimal posture with work and driving tasks.  -CN  "           PT Plan    PT Plan Comments Assess response to added exercises and manual. Follow up regarding radicular symptoms and continue to progress as tolerated. Consider rows and standing open book next visit.  -CN           User Key  (r) = Recorded By, (t) = Taken By, (c) = Cosigned By    Initials Name Provider Type    Mira Hope, PT Physical Therapist                   OP Exercises     Row Name 10/25/21 1000 10/25/21 0900          Subjective Comments    Subjective Comments It was a little sore after last time, not for long though.  -CN --            Subjective Pain    Able to rate subjective pain? yes  -CN --     Pre-Treatment Pain Level 2  -CN --            Total Minutes    47036 - PT Therapeutic Exercise Minutes 20  -CN --     02025 - PT Manual Therapy Minutes -- 20  -CN            Exercise 1    Exercise Name 1 Supine chin tuck  -CN --     Cueing 1 Verbal; Demo  -CN --     Reps 1 10  -CN --     Time 1 5 sec  -CN --            Exercise 2    Exercise Name 2 Supine chin tuck with cervical rotation  -CN --     Cueing 2 Verbal; Demo  -CN --     Reps 2 10  -CN --            Exercise 3    Exercise Name 3 Seated post shoulder rolls  -CN --     Cueing 3 Verbal  -CN --     Reps 3 10  -CN --            Exercise 4    Exercise Name 4 Scap squeeze  -CN --     Cueing 4 Verbal; Demo  -CN --     Reps 4 10  -CN --            Exercise 5    Exercise Name 5 Doorway pec stretch, arms low  -CN --     Cueing 5 Verbal; Demo  -CN --     Reps 5 3  -CN --     Time 5 20 sec  -CN --     Additional Comments cues for upright posture  -CN --           User Key  (r) = Recorded By, (t) = Taken By, (c) = Cosigned By    Initials Name Provider Type    Mira Hope, PT Physical Therapist                         Manual Rx (last 36 hours)     Manual Treatments     Row Name 10/25/21 0900             Total Minutes    21240 - PT Manual Therapy Minutes 20  -CN              Manual Rx 1    Manual Rx 1 Location stm to B  UT/levator, suboccipital release, cervical distraction with sustained holds  -CN      Manual Rx 1 Duration no numbness into R UE at end of manual  -CN            User Key  (r) = Recorded By, (t) = Taken By, (c) = Cosigned By    Initials Name Provider Type    Mira Hope, PT Physical Therapist                 PT OP Goals     Row Name 10/25/21 1300          PT Short Term Goals    STG Date to Achieve 11/19/21  -CN     STG 1 Pt will report pain rated 4/10 at worst in order to demonstrate ability to return to normalized ADLs and functional activities.  -CN     STG 1 Progress Ongoing  -CN     STG 2 Pt will be independent with initial HEP for symptom management.  -CN     STG 2 Progress Ongoing  -CN     STG 2 Progress Comments Administered today.  -CN            Long Term Goals    LTG Date to Achieve 12/19/21  -CN     LTG 1 Pt will be independent and compliant with advanced HEP for long term management of symptoms and prevention of future occurrence.  -CN     LTG 1 Progress Ongoing  -CN     LTG 2 Pt will reduce level of perceived disability as measured by the quick DASH  to 15% in order to improve QOL.  -CN     LTG 2 Progress Ongoing  -CN     LTG 3 Pt will report centralization of symptoms to R shoulder/neck in order to improve ability to perform work and home tasks.  -CN     LTG 3 Progress Ongoing  -CN     LTG 4 Pt will demonstrate R hand  strength to 70# in order to improve ability to perform fine motor tasks.  -CN     LTG 4 Progress Ongoing  -CN           User Key  (r) = Recorded By, (t) = Taken By, (c) = Cosigned By    Initials Name Provider Type    Mira Hope, BRITTANY Physical Therapist                Therapy Education  Given: Symptoms/condition management, Pain management, Posture/body mechanics  Program: Reinforced  How Provided: Verbal, Demonstration, Written  Provided to: Patient  Level of Understanding: Teach back education performed, Verbalized, Demonstrated              Time  Calculation:   Start Time: 1020  Stop Time: 1100  Time Calculation (min): 40 min  Timed Charges  99517 - PT Therapeutic Exercise Minutes: 20  97532 - PT Manual Therapy Minutes: 20  Total Minutes  Timed Charges Total Minutes: 20   Total Minutes: 20  Therapy Charges for Today     Code Description Service Date Service Provider Modifiers Qty    75342802639 HC PT THER PROC EA 15 MIN 10/25/2021 Mira Mart, PT GP 2    89070394495 HC PT MANUAL THERAPY EA 15 MIN 10/25/2021 Mira Mart, PT GP 1                    Mira Mart, PT  10/25/2021

## 2021-11-02 ENCOUNTER — HOSPITAL ENCOUNTER (OUTPATIENT)
Dept: PHYSICAL THERAPY | Facility: HOSPITAL | Age: 51
Setting detail: THERAPIES SERIES
Discharge: HOME | End: 2021-11-02

## 2021-11-02 DIAGNOSIS — M54.2 CERVICAL PAIN: Primary | ICD-10-CM

## 2021-11-02 DIAGNOSIS — R29.3 POOR POSTURE: ICD-10-CM

## 2021-11-02 DIAGNOSIS — M54.12 CERVICAL RADICULOPATHY: ICD-10-CM

## 2021-11-02 PROCEDURE — 97140 MANUAL THERAPY 1/> REGIONS: CPT | Performed by: PHYSICAL THERAPIST

## 2021-11-02 NOTE — THERAPY TREATMENT NOTE
"    Outpatient Physical Therapy Ortho Treatment Note  [facility]     Patient Name: Alex Schafer  : 1970  MRN: [id number]  Today's Date: 2021      Visit Date: 2021    Visit Dx:    ICD-10-CM ICD-9-CM   1. Cervical pain  M54.2 723.1   2. Poor posture  R29.3 781.92   3. Cervical radiculopathy  M54.12 723.4       Patient Active Problem List   Diagnosis   • Hyperlipidemia   • Hypertension   • GERD (gastroesophageal reflux disease)   • Morbid obesity with body mass index of 60.0-69.9 in adult ([facility])   • GERBER on CPAP   • IFG (impaired fasting glucose)        Past Medical History:   Diagnosis Date   • Abdominal pain    • Asthma, exercise induced    • Back pain    • Bipolar disorder ([facility])    • Chest pain    • Chronic peptic ulcer    • Depression    • Diarrhea    • Difficulty sleeping    • Diverticulitis    • Dizziness    • Erectile dysfunction    • Fatigue    • Fever    • Gas    • GERD (gastroesophageal reflux disease)    • Headache    • Heart murmur    • Hepatitis A virus infection    • Hyperlipidemia    • Hypertension    • Irregular heart beat    • Itching    • Leg swelling    • Nausea    • Nervousness    • Palpitations    • Pneumonia    • Rectal bleeding    • Renal failure     \"small\"   • Seasonal allergies    • Sleep apnea    • Stomach cramps    • Vomiting         Past Surgical History:   Procedure Laterality Date   • COLONOSCOPY     • TONSILLECTOMY     • TONSILLECTOMY                          PT Assessment/Plan     Row Name 21 0927          PT Assessment    Assessment Comments Mr. Schafer returns today, reporting just having an asthma attack. Secondary to recent asthma attack, held on exercises today and focused on manual work. He reports near immediate releif of RUE symptoms with sub occiptal release. Educated him in self sub occipital release at home. He also reports relief of RUE and ulnar nerve distribution symptoms with R ulnar nerve glides.  He may be a good candidate for c spine " mechanical traction. Mr. Schafer continues to be a good candidate for skilled physical therapy .  -GJ            PT Plan    PT Plan Comments assess response to manual nerve glides/sub occipital release. return to postural strengthening, shoulder ext/rows with tband, shoulder ER, doorway stretch, may add neural mobs for home, consider c spine traction mechanically  -GJ           User Key  (r) = Recorded By, (t) = Taken By, (c) = Cosigned By    Initials Name Provider Type    Elias Manuel, PT Physical Therapist                   OP Exercises     Row Name 11/02/21 0845 11/02/21 0800          Subjective Comments    Subjective Comments -- i had an asthma attack right before I got here.  Still having pain in R upper arm/shoulder  -GJ            Total Minutes    62607 - PT Manual Therapy Minutes 25  -GJ --            Exercise 6    Exercise Name 6 -- --  -GJ     Time 6 -- --  -GJ           User Key  (r) = Recorded By, (t) = Taken By, (c) = Cosigned By    Initials Name Provider Type    Elias Manuel, PT Physical Therapist                         Manual Rx (last 36 hours)     Manual Treatments     Row Name 11/02/21 0900 11/02/21 0845          Total Minutes    42210 - PT Manual Therapy Minutes -- 25  -GJ            Manual Rx 1    Manual Rx 1 Location stm to B UT/levator, suboccipital release, cervical distraction with sustained holds  -GJ --     Manual Rx 1 Type stretch of R>L UT and levator tissues  -GJ --     Manual Rx 1 Grade RUE nerural glides, pt supine, median, ulnar, scapula depressed, head in neutral  -GJ --           User Key  (r) = Recorded By, (t) = Taken By, (c) = Cosigned By    Initials Name Provider Type    Elias Manuel, PT Physical Therapist                 PT OP Goals     Row Name 11/02/21 0800          PT Short Term Goals    STG Date to Achieve 11/19/21  -GJ     STG 1 Pt will report pain rated 4/10 at worst in order to demonstrate ability to return to normalized ADLs and functional activities.   -GJ     STG 1 Progress Ongoing  -GJ     STG 2 Pt will be independent with initial HEP for symptom management.  -GJ     STG 2 Progress Ongoing  -GJ     STG 2 Progress Comments verbally reviewed, encouraged to continue  -GJ            Long Term Goals    LTG Date to Achieve 12/19/21  -GJ     LTG 1 Pt will be independent and compliant with advanced HEP for long term management of symptoms and prevention of future occurrence.  -GJ     LTG 1 Progress Ongoing  -GJ     LTG 2 Pt will reduce level of perceived disability as measured by the quick DASH  to 15% in order to improve QOL.  -GJ     LTG 2 Progress Ongoing  -GJ     LTG 3 Pt will report centralization of symptoms to R shoulder/neck in order to improve ability to perform work and home tasks.  -GJ     LTG 3 Progress Ongoing  -GJ     LTG 4 Pt will demonstrate R hand  strength to 70# in order to improve ability to perform fine motor tasks.  -GJ     LTG 4 Progress Ongoing  -GJ           User Key  (r) = Recorded By, (t) = Taken By, (c) = Cosigned By    Initials Name Provider Type    Elias Manuel, PT Physical Therapist                Therapy Education  Education Details: reviewed posture/ergonomics bhavik with computer work, phone use. Educated in use of 2 tennis balls taped together for self sub occipital release, discussed possible c spine traction in the clinic and at home if it continues to be somewhat beneficial  Given: Symptoms/condition management, HEP, Pain management, Posture/body mechanics, Mobility training  Program: Reinforced  How Provided: Verbal  Provided to: Patient  Level of Understanding: Verbalized              Time Calculation:   Start Time: 0845 (appt time 0830)  Stop Time: 0915  Time Calculation (min): 30 min  Timed Charges  66891 - PT Manual Therapy Minutes: 25  Total Minutes  Timed Charges Total Minutes: 25   Total Minutes: 25  Therapy Charges for Today     Code Description Service Date Service Provider Modifiers Qty    63461614685  PT MANUAL  THERAPY EA 15 MIN 11/2/2021 Elias Feldman, PT GP 2                    Elias Feldman, PT  11/2/2021

## 2021-11-08 ENCOUNTER — HOSPITAL ENCOUNTER (OUTPATIENT)
Dept: PHYSICAL THERAPY | Facility: HOSPITAL | Age: 51
Setting detail: THERAPIES SERIES
Discharge: HOME | End: 2021-11-08

## 2021-11-08 DIAGNOSIS — R29.3 POOR POSTURE: ICD-10-CM

## 2021-11-08 DIAGNOSIS — M54.12 CERVICAL RADICULOPATHY: ICD-10-CM

## 2021-11-08 DIAGNOSIS — M54.2 CERVICAL PAIN: Primary | ICD-10-CM

## 2021-11-08 PROCEDURE — 97110 THERAPEUTIC EXERCISES: CPT

## 2021-11-08 PROCEDURE — 97140 MANUAL THERAPY 1/> REGIONS: CPT

## 2021-11-08 NOTE — THERAPY TREATMENT NOTE
"    Outpatient Physical Therapy Ortho Treatment Note  [facility]     Patient Name: Alex Schafer  : 1970  MRN: [id number]  Today's Date: 2021      Visit Date: 2021    Visit Dx:    ICD-10-CM ICD-9-CM   1. Cervical pain  M54.2 723.1   2. Poor posture  R29.3 781.92   3. Cervical radiculopathy  M54.12 723.4       Patient Active Problem List   Diagnosis   • Hyperlipidemia   • Hypertension   • GERD (gastroesophageal reflux disease)   • Morbid obesity with body mass index of 60.0-69.9 in adult ([facility])   • GERBER on CPAP   • IFG (impaired fasting glucose)        Past Medical History:   Diagnosis Date   • Abdominal pain    • Asthma, exercise induced    • Back pain    • Bipolar disorder ([facility])    • Chest pain    • Chronic peptic ulcer    • Depression    • Diarrhea    • Difficulty sleeping    • Diverticulitis    • Dizziness    • Erectile dysfunction    • Fatigue    • Fever    • Gas    • GERD (gastroesophageal reflux disease)    • Headache    • Heart murmur    • Hepatitis A virus infection    • Hyperlipidemia    • Hypertension    • Irregular heart beat    • Itching    • Leg swelling    • Nausea    • Nervousness    • Palpitations    • Pneumonia    • Rectal bleeding    • Renal failure     \"small\"   • Seasonal allergies    • Sleep apnea    • Stomach cramps    • Vomiting         Past Surgical History:   Procedure Laterality Date   • COLONOSCOPY     • TONSILLECTOMY     • TONSILLECTOMY                          PT Assessment/Plan     Row Name 21 0950          PT Assessment    Assessment Comments Pt with increased pain following last session with radiation further into R UE as well as new onset into L UE for a short period of time. Performed gentle manual therapy today and resumed seated post shoulder rolls, scap squeeze and chin tucks without increased pain. Pt denies symptoms following manual therpay and added SL arcs, rows and shoulder ext today with good tolerance.  -CN            PT Plan    PT Plan Comments " Continue with gentle manual therapy and may resume nerve glides pending pt's symptoms.  -CN           User Key  (r) = Recorded By, (t) = Taken By, (c) = Cosigned By    Initials Name Provider Type    Mira Hope, PT Physical Therapist                   OP Exercises     Row Name 11/08/21 0900             Subjective Comments    Subjective Comments It was sore for about 3 days after last time. The symtpoms were going up the arm and then they were worse. They are getting better again though.  -CN              Subjective Pain    Able to rate subjective pain? yes  -CN      Pre-Treatment Pain Level 3  -CN              Total Minutes    47791 - PT Therapeutic Exercise Minutes 20  -CN      82474 - PT Manual Therapy Minutes 22  -CN              Exercise 1    Exercise Name 1 Supine chin tuck  -CN      Cueing 1 Verbal; Demo  -CN      Reps 1 10  -CN      Time 1 5 sec  -CN              Exercise 2    Exercise Name 2 Supine chin tuck with cervical rotation  -CN      Cueing 2 Verbal; Demo  -CN      Reps 2 10  -CN              Exercise 3    Exercise Name 3 Seated post shoulder rolls  -CN      Cueing 3 Verbal  -CN      Reps 3 20  -CN              Exercise 4    Exercise Name 4 Scap squeeze  -CN      Cueing 4 Verbal; Demo  -CN      Reps 4 20  -CN              Exercise 6    Exercise Name 6 SL arcs  -CN      Cueing 6 Verbal; Demo  -CN      Reps 6 10 B  -CN              Exercise 7    Exercise Name 7 Rows  -CN      Cueing 7 Verbal; Demo  -CN      Reps 7 20  -CN      Additional Comments GTB  -CN              Exercise 8    Exercise Name 8 Shoudler ext  -CN      Cueing 8 Verbal; Tactile  -CN      Reps 8 20  -CN      Additional Comments GTB  -CN            User Key  (r) = Recorded By, (t) = Taken By, (c) = Cosigned By    Initials Name Provider Type    Mira Hope, PT Physical Therapist                         Manual Rx (last 36 hours)     Manual Treatments     Row Name 11/08/21 0900             Total Minutes    68134  - PT Manual Therapy Minutes 22  -CN              Manual Rx 1    Manual Rx 1 Location stm to B UT/levator, suboccipital release, cervical distraction with sustained holds  -CN      Manual Rx 1 Duration no numbness into R UE at end of manual  -CN            User Key  (r) = Recorded By, (t) = Taken By, (c) = Cosigned By    Initials Name Provider Type    Mira Hope, PT Physical Therapist                 PT OP Goals     Row Name 11/08/21 0900          PT Short Term Goals    STG Date to Achieve 11/19/21  -CN     STG 1 Pt will report pain rated 4/10 at worst in order to demonstrate ability to return to normalized ADLs and functional activities.  -CN     STG 1 Progress Ongoing  -CN     STG 1 Progress Comments Pt with increased pain following last session, no increase in symtpoms at end of session today. Will continue to monitor.  -CN     STG 2 Pt will be independent with initial HEP for symptom management.  -CN     STG 2 Progress Ongoing  -CN            Long Term Goals    LTG Date to Achieve 12/19/21  -CN     LTG 1 Pt will be independent and compliant with advanced HEP for long term management of symptoms and prevention of future occurrence.  -CN     LTG 1 Progress Ongoing  -CN     LTG 2 Pt will reduce level of perceived disability as measured by the quick DASH  to 15% in order to improve QOL.  -CN     LTG 2 Progress Ongoing  -CN     LTG 3 Pt will report centralization of symptoms to R shoulder/neck in order to improve ability to perform work and home tasks.  -CN     LTG 3 Progress Ongoing  -CN     LTG 4 Pt will demonstrate R hand  strength to 70# in order to improve ability to perform fine motor tasks.  -CN     LTG 4 Progress Ongoing  -CN           User Key  (r) = Recorded By, (t) = Taken By, (c) = Cosigned By    Initials Name Provider Type    Mira Hope, PT Physical Therapist                Therapy Education  Given: Symptoms/condition management, HEP, Pain management, Posture/body  mechanics, Mobility training  Program: Reinforced, Progressed  How Provided: Verbal  Provided to: Patient  Level of Understanding: Verbalized              Time Calculation:   Start Time: 0917  Stop Time: 1000  Time Calculation (min): 43 min  Timed Charges  71271 - PT Therapeutic Exercise Minutes: 20  52004 - PT Manual Therapy Minutes: 22  Total Minutes  Timed Charges Total Minutes: 42   Total Minutes: 42  Therapy Charges for Today     Code Description Service Date Service Provider Modifiers Qty    28544471410 HC PT THER PROC EA 15 MIN 11/8/2021 Mira Mart, PT GP 1    40459857102 HC PT MANUAL THERAPY EA 15 MIN 11/8/2021 Mira Mart, PT GP 2                    Mira Mart, PT  11/8/2021

## 2021-11-15 ENCOUNTER — HOSPITAL ENCOUNTER (OUTPATIENT)
Dept: PHYSICAL THERAPY | Facility: HOSPITAL | Age: 51
Setting detail: THERAPIES SERIES
Discharge: HOME | End: 2021-11-15

## 2021-11-15 DIAGNOSIS — M54.12 CERVICAL RADICULOPATHY: ICD-10-CM

## 2021-11-15 DIAGNOSIS — M54.2 CERVICAL PAIN: Primary | ICD-10-CM

## 2021-11-15 DIAGNOSIS — R29.3 POOR POSTURE: ICD-10-CM

## 2021-11-15 PROCEDURE — 97140 MANUAL THERAPY 1/> REGIONS: CPT | Performed by: PHYSICAL THERAPIST

## 2021-11-15 PROCEDURE — 97110 THERAPEUTIC EXERCISES: CPT | Performed by: PHYSICAL THERAPIST

## 2021-11-15 NOTE — THERAPY TREATMENT NOTE
"    Outpatient Physical Therapy Ortho Treatment Note  [facility]     Patient Name: Alex Schafer  : 1970  MRN: [id number]  Today's Date: 11/15/2021      Visit Date: 11/15/2021    Visit Dx:    ICD-10-CM ICD-9-CM   1. Cervical pain  M54.2 723.1   2. Poor posture  R29.3 781.92   3. Cervical radiculopathy  M54.12 723.4       Patient Active Problem List   Diagnosis   • Hyperlipidemia   • Hypertension   • GERD (gastroesophageal reflux disease)   • Morbid obesity with body mass index of 60.0-69.9 in adult ([facility])   • GERBER on CPAP   • IFG (impaired fasting glucose)        Past Medical History:   Diagnosis Date   • Abdominal pain    • Asthma, exercise induced    • Back pain    • Bipolar disorder ([facility])    • Chest pain    • Chronic peptic ulcer    • Depression    • Diarrhea    • Difficulty sleeping    • Diverticulitis    • Dizziness    • Erectile dysfunction    • Fatigue    • Fever    • Gas    • GERD (gastroesophageal reflux disease)    • Headache    • Heart murmur    • Hepatitis A virus infection    • Hyperlipidemia    • Hypertension    • Irregular heart beat    • Itching    • Leg swelling    • Nausea    • Nervousness    • Palpitations    • Pneumonia    • Rectal bleeding    • Renal failure     \"small\"   • Seasonal allergies    • Sleep apnea    • Stomach cramps    • Vomiting         Past Surgical History:   Procedure Laterality Date   • COLONOSCOPY     • TONSILLECTOMY     • TONSILLECTOMY          PT Ortho     Row Name 11/15/21 0700        Strength Right    # Reps 3  -GJ    Right Rung 3  -GJ    Right  Test 1 70  -GJ    Right  Test 2 65  -GJ    Right  Test 3 65  -GJ     Strength Average Right 66.67  -GJ          User Key  (r) = Recorded By, (t) = Taken By, (c) = Cosigned By    Initials Name Provider Type    Elias Manuel, PT Physical Therapist                             PT Assessment/Plan     Row Name 11/15/21 0858          PT Assessment    Assessment Comments Mr. Schafer returns today, reporting " good result following previous session. He reports centralization of his symptoms, and is no longer takying tylenol.  He reports not waking at night secondary to RUE symptoms.  He has met 2 of 2 STGs and is progressing toward all remaining goals. His R  strength is progressed since initial evaluation (see ortho).  Continued to hold on neural gildes, added biceps curl and issued green T band for home.  We discussed possible transition to HEP following his next 2 sessions.  Mr. Schafer continues to be a good candidate for skilled physical therapy.  -GJ            PT Plan    PT Plan Comments likely transition to HEP in 2 sessions.  consider  strengthening, continue C spine distraction manually  -GJ           User Key  (r) = Recorded By, (t) = Taken By, (c) = Cosigned By    Initials Name Provider Type    Elias Manuel, PT Physical Therapist                   OP Exercises     Row Name 11/15/21 0750 11/15/21 0700          Subjective Comments    Subjective Comments -- i actually feel like my arm is getting better  -GJ            Total Minutes    67197 - PT Therapeutic Exercise Minutes 17  -GJ --     39022 - PT Manual Therapy Minutes 23  -GJ --            Exercise 1    Exercise Name 1 -- seated chin tuck  -GJ     Cueing 1 -- Verbal; Demo  -GJ     Reps 1 -- 15  -GJ     Time 1 -- 5s  -GJ     Additional Comments -- changed from supine to seated  -GJ            Exercise 2    Exercise Name 2 -- seated chin tucks  -GJ     Cueing 2 -- Verbal; Demo  -GJ     Reps 2 -- 15  -GJ     Time 2 -- 5s  -GJ            Exercise 3    Exercise Name 3 -- standing post shoulder rolls  -GJ     Cueing 3 -- Verbal  -GJ     Reps 3 -- 20  -GJ     Additional Comments -- 5#  -GJ            Exercise 5    Exercise Name 5 -- Doorway pec stretch, arms low  -GJ     Cueing 5 -- Verbal; Demo  -GJ     Reps 5 -- 3  -GJ     Time 5 -- 20 sec  -GJ            Exercise 6    Exercise Name 6 -- SL arcs  -GJ     Cueing 6 -- Verbal; Demo  -GJ     Reps 6 -- 10 B   -GJ            Exercise 7    Exercise Name 7 -- Rows  -GJ     Cueing 7 -- Verbal; Demo  -GJ     Reps 7 -- 20  -GJ     Additional Comments -- GTB  -GJ            Exercise 8    Exercise Name 8 -- Shoudler ext  -GJ     Cueing 8 -- Verbal; Tactile  -GJ     Reps 8 -- 20  -GJ     Additional Comments -- GTB  -GJ            Exercise 9    Exercise Name 9 -- standing biceps curl, B  -GJ     Cueing 9 -- Verbal; Demo  -GJ     Reps 9 -- 20  -GJ     Time 9 -- 5#  -GJ           User Key  (r) = Recorded By, (t) = Taken By, (c) = Cosigned By    Initials Name Provider Type    Elias Manuel, PT Physical Therapist                         Manual Rx (last 36 hours)     Manual Treatments     Row Name 11/15/21 0750 11/15/21 0700          Total Minutes    98903 - PT Manual Therapy Minutes 23  -GJ --            Manual Rx 1    Manual Rx 1 Location -- stm to B UT/levator, suboccipital release, cervical distraction with sustained holds  -GJ     Manual Rx 1 Type -- stretch of B UT and levator tissues  -GJ           User Key  (r) = Recorded By, (t) = Taken By, (c) = Cosigned By    Initials Name Provider Type    Elias Manuel, PT Physical Therapist                 PT OP Goals     Row Name 11/15/21 0700          PT Short Term Goals    STG Date to Achieve 11/19/21  -GJ     STG 1 Pt will report pain rated 4/10 at worst in order to demonstrate ability to return to normalized ADLs and functional activities.  -GJ     STG 1 Progress Met  -GJ     STG 1 Progress Comments 0/10 currently, 3/10 at worse  -GJ     STG 2 Pt will be independent with initial HEP for symptom management.  -GJ     STG 2 Progress Met  -GJ            Long Term Goals    LTG Date to Achieve 12/19/21  -GJ     LTG 1 Pt will be independent and compliant with advanced HEP for long term management of symptoms and prevention of future occurrence.  -GJ     LTG 1 Progress Ongoing  -GJ     LTG 2 Pt will reduce level of perceived disability as measured by the quick DASH  to 15% in order  to improve QOL.  -GJ     LTG 2 Progress Ongoing  -GJ     LTG 3 Pt will report centralization of symptoms to R shoulder/neck in order to improve ability to perform work and home tasks.  -GJ     LTG 3 Progress Ongoing  -GJ     LTG 3 Progress Comments reports some symptoms in arm/hand, but not as much  -GJ     LTG 4 Pt will demonstrate R hand  strength to 70# in order to improve ability to perform fine motor tasks.  -GJ     LTG 4 Progress Ongoing; Progressing  -GJ     LTG 4 Progress Comments average of 3 at 66, up from inital eval of 59 pound average  -GJ           User Key  (r) = Recorded By, (t) = Taken By, (c) = Cosigned By    Initials Name Provider Type     Elias Feldman, PT Physical Therapist                Therapy Education  Education Details: reviewed postural implications, continuing HEP  Given: HEP, Symptoms/condition management, Pain management, Posture/body mechanics, Mobility training  Program: Reinforced, New, Progressed  How Provided: Verbal, Demonstration  Provided to: Patient  Level of Understanding: Teach back education performed, Verbalized, Demonstrated              Time Calculation:   Start Time: 0750  Stop Time: 0830  Time Calculation (min): 40 min  Timed Charges  93430 - PT Therapeutic Exercise Minutes: 17  22047 - PT Manual Therapy Minutes: 23  Total Minutes  Timed Charges Total Minutes: 40   Total Minutes: 40  Therapy Charges for Today     Code Description Service Date Service Provider Modifiers Qty    01331850406  PT THER PROC EA 15 MIN 11/15/2021 Elias Feldman, PT GP 1    [id number]  PT MANUAL THERAPY EA 15 MIN 11/15/2021 Elias Feldman, PT GP 2                    Elias Feldman PT  11/15/2021

## 2021-11-22 ENCOUNTER — APPOINTMENT (OUTPATIENT)
Dept: PHYSICAL THERAPY | Facility: HOSPITAL | Age: 51
End: 2021-11-22

## 2021-11-29 ENCOUNTER — HOSPITAL ENCOUNTER (OUTPATIENT)
Dept: PHYSICAL THERAPY | Facility: HOSPITAL | Age: 51
Setting detail: THERAPIES SERIES
Discharge: HOME | End: 2021-11-29

## 2021-11-29 DIAGNOSIS — R29.3 POOR POSTURE: ICD-10-CM

## 2021-11-29 DIAGNOSIS — M54.12 CERVICAL RADICULOPATHY: ICD-10-CM

## 2021-11-29 DIAGNOSIS — M54.2 CERVICAL PAIN: Primary | ICD-10-CM

## 2021-11-29 PROCEDURE — 97110 THERAPEUTIC EXERCISES: CPT

## 2021-11-29 PROCEDURE — 97140 MANUAL THERAPY 1/> REGIONS: CPT

## 2021-11-29 NOTE — THERAPY TREATMENT NOTE
"    Outpatient Physical Therapy Ortho Treatment Note  [facility]     Patient Name: Alex Schafer  : 1970  MRN: [id number]  Today's Date: 2021      Visit Date: 2021    Visit Dx:    ICD-10-CM ICD-9-CM   1. Cervical pain  M54.2 723.1   2. Poor posture  R29.3 781.92   3. Cervical radiculopathy  M54.12 723.4       Patient Active Problem List   Diagnosis   • Hyperlipidemia   • Hypertension   • GERD (gastroesophageal reflux disease)   • Morbid obesity with body mass index of 60.0-69.9 in adult ([facility])   • GERBER on CPAP   • IFG (impaired fasting glucose)        Past Medical History:   Diagnosis Date   • Abdominal pain    • Asthma, exercise induced    • Back pain    • Bipolar disorder ([facility])    • Chest pain    • Chronic peptic ulcer    • Depression    • Diarrhea    • Difficulty sleeping    • Diverticulitis    • Dizziness    • Erectile dysfunction    • Fatigue    • Fever    • Gas    • GERD (gastroesophageal reflux disease)    • Headache    • Heart murmur    • Hepatitis A virus infection    • Hyperlipidemia    • Hypertension    • Irregular heart beat    • Itching    • Leg swelling    • Nausea    • Nervousness    • Palpitations    • Pneumonia    • Rectal bleeding    • Renal failure     \"small\"   • Seasonal allergies    • Sleep apnea    • Stomach cramps    • Vomiting         Past Surgical History:   Procedure Laterality Date   • COLONOSCOPY     • TONSILLECTOMY     • TONSILLECTOMY                          PT Assessment/Plan     Row Name 21 0836          PT Assessment    Assessment Comments Pt with increased symptoms at beginning of session, rated 6/10 into fingers per assisting wife with project on the computer. Pt reports pain rated 1/10 following manual and several exercises and compliant with HEP.  Plan to reassess in 2 weeks and D/C to I management vs schedule further visits pending symptoms.  -CN            PT Plan    PT Plan Comments Assess symptom management and D/C vs schedule more visits.  -CN  "          User Key  (r) = Recorded By, (t) = Taken By, (c) = Cosigned By    Initials Name Provider Type    Mira Hope, PT Physical Therapist                   OP Exercises     Row Name 11/29/21 0700             Subjective Comments    Subjective Comments It is worse today but I was helping my wife with a report and so I was looking on the computer. I have numbness into my fingertips.  -CN              Subjective Pain    Able to rate subjective pain? yes  -CN      Pre-Treatment Pain Level 6  -CN              Total Minutes    73324 - PT Therapeutic Exercise Minutes 20  -CN      99827 - PT Manual Therapy Minutes 20  -CN              Exercise 1    Exercise Name 1 supine chin tuck  -CN      Cueing 1 Verbal; Demo  -CN      Reps 1 15  -CN      Time 1 5s  -CN              Exercise 2    Exercise Name 2 Supine chin tuck with cervical rotation  -CN      Reps 2 10  -CN      Time 2 5s  -CN              Exercise 3    Exercise Name 3 standing post shoulder rolls  -CN      Cueing 3 Verbal  -CN      Reps 3 20  -CN      Additional Comments 5#  -CN              Exercise 5    Exercise Name 5 Doorway pec stretch, arms low  -CN      Cueing 5 Verbal; Demo  -CN      Reps 5 3  -CN      Time 5 20 sec  -CN              Exercise 6    Exercise Name 6 SL arcs  -CN      Cueing 6 Verbal; Demo  -CN      Reps 6 10 B  -CN              Exercise 7    Exercise Name 7 Rows  -CN      Cueing 7 Verbal; Demo  -CN      Reps 7 20  -CN      Additional Comments GTB  -CN              Exercise 8    Exercise Name 8 Shoudler ext  -CN      Cueing 8 Verbal; Tactile  -CN      Reps 8 20  -CN      Additional Comments GTB  -CN              Exercise 9    Exercise Name 9 standing biceps curl, B  -CN      Cueing 9 Verbal; Demo  -CN      Reps 9 20  -CN      Time 9 5#  -CN              Exercise 10    Exercise Name 10 Standing B UE ER with noodle at wall  -CN      Cueing 10 Verbal; Demo  -CN      Reps 10 15  -CN      Additional Comments GTB  -CN            User  Key  (r) = Recorded By, (t) = Taken By, (c) = Cosigned By    Initials Name Provider Type    Mira Hope, PT Physical Therapist                         Manual Rx (last 36 hours)     Manual Treatments     Row Name 11/29/21 0700             Total Minutes    99739 - PT Manual Therapy Minutes 20  -CN              Manual Rx 1    Manual Rx 1 Location stm to B UT/levator, suboccipital release, cervical distraction with sustained holds  -CN      Manual Rx 1 Type stretch of B UT and levator tissues  -CN            User Key  (r) = Recorded By, (t) = Taken By, (c) = Cosigned By    Initials Name Provider Type    Mira Hope, PT Physical Therapist                 PT OP Goals     Row Name 11/29/21 0800          PT Short Term Goals    STG Date to Achieve 11/19/21  -CN     STG 1 Pt will report pain rated 4/10 at worst in order to demonstrate ability to return to normalized ADLs and functional activities.  -CN     STG 1 Progress Met  -CN     STG 2 Pt will be independent with initial HEP for symptom management.  -CN     STG 2 Progress Met  -CN            Long Term Goals    LTG Date to Achieve 12/19/21  -CN     LTG 1 Pt will be independent and compliant with advanced HEP for long term management of symptoms and prevention of future occurrence.  -CN     LTG 1 Progress Ongoing  -CN     LTG 1 Progress Comments Pt compliant with current HEP.  -CN     LTG 2 Pt will reduce level of perceived disability as measured by the quick DASH  to 15% in order to improve QOL.  -CN     LTG 2 Progress Ongoing  -CN     LTG 3 Pt will report centralization of symptoms to R shoulder/neck in order to improve ability to perform work and home tasks.  -CN     LTG 3 Progress Ongoing  -CN     LTG 3 Progress Comments Pt with radiation into R hand at beginning of session, no numbness at end of session.  -CN     LTG 4 Pt will demonstrate R hand  strength to 70# in order to improve ability to perform fine motor tasks.  -CN     LTG 4  Progress Ongoing; Progressing  -SAL           User Key  (r) = Recorded By, (t) = Taken By, (c) = Cosigned By    Initials Name Provider Type    Mira Hope, PT Physical Therapist                Therapy Education  Given: HEP, Symptoms/condition management, Pain management, Posture/body mechanics, Mobility training  Program: Reinforced, Progressed  How Provided: Verbal, Demonstration  Provided to: Patient  Level of Understanding: Teach back education performed, Verbalized, Demonstrated              Time Calculation:   Start Time: 0751  Stop Time: 0831  Time Calculation (min): 40 min  Timed Charges  95761 - PT Therapeutic Exercise Minutes: 20  93421 - PT Manual Therapy Minutes: 20  Total Minutes  Timed Charges Total Minutes: 40   Total Minutes: 40  Therapy Charges for Today     Code Description Service Date Service Provider Modifiers Qty    [id number]  PT THER PROC EA 15 MIN 11/29/2021 Mira Mart, PT GP 2    [id number] HC PT MANUAL THERAPY EA 15 MIN 11/29/2021 Mira Mart, PT GP 1                    Mira Mart PT  11/29/2021

## 2024-09-09 ENCOUNTER — PRIOR AUTHORIZATION (OUTPATIENT)
Dept: FAMILY MEDICINE CLINIC | Facility: CLINIC | Age: 54
End: 2024-09-09
Payer: COMMERCIAL